# Patient Record
Sex: MALE | Race: WHITE | NOT HISPANIC OR LATINO
[De-identification: names, ages, dates, MRNs, and addresses within clinical notes are randomized per-mention and may not be internally consistent; named-entity substitution may affect disease eponyms.]

---

## 2017-07-10 ENCOUNTER — APPOINTMENT (OUTPATIENT)
Dept: INTERNAL MEDICINE | Facility: CLINIC | Age: 47
End: 2017-07-10

## 2017-07-10 VITALS
HEART RATE: 56 BPM | BODY MASS INDEX: 22.66 KG/M2 | RESPIRATION RATE: 12 BRPM | TEMPERATURE: 98.4 F | OXYGEN SATURATION: 96 % | HEIGHT: 69 IN | SYSTOLIC BLOOD PRESSURE: 115 MMHG | DIASTOLIC BLOOD PRESSURE: 75 MMHG | WEIGHT: 153 LBS

## 2017-07-10 DIAGNOSIS — Z80.8 FAMILY HISTORY OF MALIGNANT NEOPLASM OF OTHER ORGANS OR SYSTEMS: ICD-10-CM

## 2017-07-10 DIAGNOSIS — Z87.898 PERSONAL HISTORY OF OTHER SPECIFIED CONDITIONS: ICD-10-CM

## 2017-07-16 LAB
25(OH)D3 SERPL-MCNC: 32.2 NG/ML
ALBUMIN SERPL ELPH-MCNC: 5 G/DL
ALP BLD-CCNC: 43 U/L
ALT SERPL-CCNC: 14 U/L
ANA SER IF-ACNC: NEGATIVE
ANION GAP SERPL CALC-SCNC: 13 MMOL/L
APPEARANCE: CLEAR
AST SERPL-CCNC: 22 U/L
BASOPHILS # BLD AUTO: 0.02 K/UL
BASOPHILS NFR BLD AUTO: 0.4 %
BILIRUB SERPL-MCNC: 0.7 MG/DL
BILIRUBIN URINE: NEGATIVE
BLOOD URINE: NEGATIVE
BUN SERPL-MCNC: 19 MG/DL
CALCIUM SERPL-MCNC: 9.7 MG/DL
CHLORIDE SERPL-SCNC: 103 MMOL/L
CHOLEST SERPL-MCNC: 226 MG/DL
CHOLEST/HDLC SERPL: 3.1 RATIO
CO2 SERPL-SCNC: 25 MMOL/L
COLOR: YELLOW
CREAT SERPL-MCNC: 1.21 MG/DL
CRP SERPL-MCNC: <0.2 MG/DL
EOSINOPHIL # BLD AUTO: 0.3 K/UL
EOSINOPHIL NFR BLD AUTO: 5.7 %
ERYTHROCYTE [SEDIMENTATION RATE] IN BLOOD BY WESTERGREN METHOD: 4 MM/HR
FOLATE SERPL-MCNC: 13.3 NG/ML
FRUCTOSAMINE SERPL-MCNC: 290 UMOL/L
GLUCOSE QUALITATIVE U: NORMAL MG/DL
GLUCOSE SERPL-MCNC: 90 MG/DL
HBA1C MFR BLD HPLC: 5.3 %
HCT VFR BLD CALC: 45.6 %
HDLC SERPL-MCNC: 73 MG/DL
HGB BLD-MCNC: 15.2 G/DL
IMM GRANULOCYTES NFR BLD AUTO: 0 %
IRON SATN MFR SERPL: 43 %
IRON SERPL-MCNC: 85 UG/DL
KETONES URINE: NEGATIVE
LDLC SERPL CALC-MCNC: 132 MG/DL
LEUKOCYTE ESTERASE URINE: NEGATIVE
LYMPHOCYTES # BLD AUTO: 1.76 K/UL
LYMPHOCYTES NFR BLD AUTO: 33.2 %
MAN DIFF?: NORMAL
MCHC RBC-ENTMCNC: 31 PG
MCHC RBC-ENTMCNC: 33.3 GM/DL
MCV RBC AUTO: 93.1 FL
MONOCYTES # BLD AUTO: 0.55 K/UL
MONOCYTES NFR BLD AUTO: 10.4 %
NEUTROPHILS # BLD AUTO: 2.67 K/UL
NEUTROPHILS NFR BLD AUTO: 50.3 %
NITRITE URINE: NEGATIVE
PH URINE: 5.5
PLATELET # BLD AUTO: 205 K/UL
POTASSIUM SERPL-SCNC: 4.5 MMOL/L
PROT SERPL-MCNC: 7.7 G/DL
PROTEIN URINE: NEGATIVE MG/DL
RBC # BLD: 4.9 M/UL
RBC # FLD: 13.7 %
RHEUMATOID FACT SER QL: 9 IU/ML
SODIUM SERPL-SCNC: 141 MMOL/L
SPECIFIC GRAVITY URINE: 1.02
TIBC SERPL-MCNC: 198 UG/DL
TRIGL SERPL-MCNC: 103 MG/DL
TSH SERPL-ACNC: 0.85 UIU/ML
UIBC SERPL-MCNC: 113 UG/DL
UROBILINOGEN URINE: NORMAL MG/DL
VIT B12 SERPL-MCNC: 733 PG/ML
WBC # FLD AUTO: 5.3 K/UL

## 2017-07-21 LAB

## 2017-07-24 ENCOUNTER — APPOINTMENT (OUTPATIENT)
Dept: INTERNAL MEDICINE | Facility: CLINIC | Age: 47
End: 2017-07-24

## 2017-07-24 VITALS
SYSTOLIC BLOOD PRESSURE: 121 MMHG | DIASTOLIC BLOOD PRESSURE: 70 MMHG | OXYGEN SATURATION: 99 % | HEART RATE: 58 BPM | TEMPERATURE: 98.3 F | BODY MASS INDEX: 22.96 KG/M2 | HEIGHT: 69 IN | WEIGHT: 155 LBS | RESPIRATION RATE: 12 BRPM

## 2017-09-11 ENCOUNTER — APPOINTMENT (OUTPATIENT)
Dept: NEUROLOGY | Facility: CLINIC | Age: 47
End: 2017-09-11
Payer: COMMERCIAL

## 2017-09-11 VITALS
HEART RATE: 55 BPM | DIASTOLIC BLOOD PRESSURE: 70 MMHG | BODY MASS INDEX: 22.96 KG/M2 | WEIGHT: 155 LBS | SYSTOLIC BLOOD PRESSURE: 113 MMHG | HEIGHT: 69 IN

## 2017-09-11 PROCEDURE — 99205 OFFICE O/P NEW HI 60 MIN: CPT

## 2017-09-13 LAB
24R-OH-CALCIDIOL SERPL-MCNC: 43 PG/ML
25(OH)D3 SERPL-MCNC: 33.7 NG/ML
FOLATE RBC-MCNC: 980 NG/ML
HCT VFR BLD CALC: 44 %
MAGNESIUM RBC-MCNC: 5.3 MG/DL
THYROPEROXIDASE AB SERPL IA-ACNC: <10 IU/ML

## 2017-09-14 ENCOUNTER — APPOINTMENT (OUTPATIENT)
Dept: NEUROLOGY | Facility: CLINIC | Age: 47
End: 2017-09-14
Payer: COMMERCIAL

## 2017-09-14 LAB — ZINC RBC-MCNC: 1478 UG/DL

## 2017-09-14 PROCEDURE — 93886 INTRACRANIAL COMPLETE STUDY: CPT

## 2017-09-14 PROCEDURE — 93880 EXTRACRANIAL BILAT STUDY: CPT

## 2017-09-14 PROCEDURE — 93892 TCD EMBOLI DETECT W/O INJ: CPT

## 2017-09-15 LAB — COPPER SERPL-MCNC: 44 UG/DL

## 2017-09-20 LAB — PARANEOPLASTIC AB PNL SER: NORMAL

## 2017-10-06 ENCOUNTER — APPOINTMENT (OUTPATIENT)
Dept: NEUROLOGY | Facility: CLINIC | Age: 47
End: 2017-10-06
Payer: COMMERCIAL

## 2017-10-06 VITALS
HEIGHT: 69 IN | BODY MASS INDEX: 22.51 KG/M2 | WEIGHT: 152 LBS | SYSTOLIC BLOOD PRESSURE: 122 MMHG | DIASTOLIC BLOOD PRESSURE: 76 MMHG | HEART RATE: 54 BPM

## 2017-10-06 PROCEDURE — 99215 OFFICE O/P EST HI 40 MIN: CPT

## 2017-10-25 ENCOUNTER — FORM ENCOUNTER (OUTPATIENT)
Age: 47
End: 2017-10-25

## 2017-10-26 ENCOUNTER — OUTPATIENT (OUTPATIENT)
Dept: OUTPATIENT SERVICES | Facility: HOSPITAL | Age: 47
LOS: 1 days | End: 2017-10-26
Payer: COMMERCIAL

## 2017-10-26 ENCOUNTER — APPOINTMENT (OUTPATIENT)
Dept: MRI IMAGING | Facility: CLINIC | Age: 47
End: 2017-10-26
Payer: COMMERCIAL

## 2017-10-26 DIAGNOSIS — H93.19 TINNITUS, UNSPECIFIED EAR: ICD-10-CM

## 2017-10-26 PROCEDURE — 72141 MRI NECK SPINE W/O DYE: CPT | Mod: 26

## 2017-10-26 PROCEDURE — 70551 MRI BRAIN STEM W/O DYE: CPT | Mod: 26

## 2017-10-26 PROCEDURE — 72141 MRI NECK SPINE W/O DYE: CPT

## 2017-10-26 PROCEDURE — 70551 MRI BRAIN STEM W/O DYE: CPT

## 2017-11-08 ENCOUNTER — OTHER (OUTPATIENT)
Age: 47
End: 2017-11-08

## 2017-11-13 ENCOUNTER — FORM ENCOUNTER (OUTPATIENT)
Age: 47
End: 2017-11-13

## 2017-11-14 ENCOUNTER — APPOINTMENT (OUTPATIENT)
Dept: MRI IMAGING | Facility: CLINIC | Age: 47
End: 2017-11-14
Payer: COMMERCIAL

## 2017-11-14 ENCOUNTER — OUTPATIENT (OUTPATIENT)
Dept: OUTPATIENT SERVICES | Facility: HOSPITAL | Age: 47
LOS: 1 days | End: 2017-11-14
Payer: COMMERCIAL

## 2017-11-14 DIAGNOSIS — R93.0 ABNORMAL FINDINGS ON DIAGNOSTIC IMAGING OF SKULL AND HEAD, NOT ELSEWHERE CLASSIFIED: ICD-10-CM

## 2017-11-14 PROCEDURE — 70553 MRI BRAIN STEM W/O & W/DYE: CPT | Mod: 26

## 2017-11-14 PROCEDURE — A9585: CPT

## 2017-11-14 PROCEDURE — 70545 MR ANGIOGRAPHY HEAD W/DYE: CPT | Mod: 26,59

## 2017-11-14 PROCEDURE — 70546 MR ANGIOGRAPH HEAD W/O&W/DYE: CPT

## 2017-11-14 PROCEDURE — 70553 MRI BRAIN STEM W/O & W/DYE: CPT

## 2017-11-14 PROCEDURE — 70544 MR ANGIOGRAPHY HEAD W/O DYE: CPT

## 2017-11-27 ENCOUNTER — APPOINTMENT (OUTPATIENT)
Dept: NEUROSURGERY | Facility: CLINIC | Age: 47
End: 2017-11-27
Payer: COMMERCIAL

## 2017-11-27 PROCEDURE — 99203 OFFICE O/P NEW LOW 30 MIN: CPT

## 2017-12-05 ENCOUNTER — APPOINTMENT (OUTPATIENT)
Dept: OPHTHALMOLOGY | Facility: CLINIC | Age: 47
End: 2017-12-05
Payer: COMMERCIAL

## 2017-12-05 PROCEDURE — 92083 EXTENDED VISUAL FIELD XM: CPT

## 2017-12-05 PROCEDURE — 99204 OFFICE O/P NEW MOD 45 MIN: CPT

## 2017-12-05 PROCEDURE — 92133 CPTRZD OPH DX IMG PST SGM ON: CPT

## 2017-12-12 ENCOUNTER — OUTPATIENT (OUTPATIENT)
Dept: OUTPATIENT SERVICES | Facility: HOSPITAL | Age: 47
LOS: 1 days | End: 2017-12-12
Payer: COMMERCIAL

## 2017-12-12 ENCOUNTER — FORM ENCOUNTER (OUTPATIENT)
Age: 47
End: 2017-12-12

## 2017-12-12 VITALS
HEIGHT: 69.5 IN | SYSTOLIC BLOOD PRESSURE: 115 MMHG | HEART RATE: 59 BPM | OXYGEN SATURATION: 96 % | RESPIRATION RATE: 18 BRPM | DIASTOLIC BLOOD PRESSURE: 65 MMHG | WEIGHT: 151.02 LBS | TEMPERATURE: 98 F

## 2017-12-12 DIAGNOSIS — I67.1 CEREBRAL ANEURYSM, NONRUPTURED: ICD-10-CM

## 2017-12-12 DIAGNOSIS — Z01.818 ENCOUNTER FOR OTHER PREPROCEDURAL EXAMINATION: ICD-10-CM

## 2017-12-12 LAB
ANION GAP SERPL CALC-SCNC: 16 MMOL/L — SIGNIFICANT CHANGE UP (ref 5–17)
BLD GP AB SCN SERPL QL: NEGATIVE — SIGNIFICANT CHANGE UP
BUN SERPL-MCNC: 22 MG/DL — SIGNIFICANT CHANGE UP (ref 7–23)
CALCIUM SERPL-MCNC: 9.5 MG/DL — SIGNIFICANT CHANGE UP (ref 8.4–10.5)
CHLORIDE SERPL-SCNC: 103 MMOL/L — SIGNIFICANT CHANGE UP (ref 96–108)
CO2 SERPL-SCNC: 24 MMOL/L — SIGNIFICANT CHANGE UP (ref 22–31)
CREAT SERPL-MCNC: 1.11 MG/DL — SIGNIFICANT CHANGE UP (ref 0.5–1.3)
GLUCOSE SERPL-MCNC: 74 MG/DL — SIGNIFICANT CHANGE UP (ref 70–99)
HCT VFR BLD CALC: 43.1 % — SIGNIFICANT CHANGE UP (ref 39–50)
HGB BLD-MCNC: 14.7 G/DL — SIGNIFICANT CHANGE UP (ref 13–17)
MCHC RBC-ENTMCNC: 30 PG — SIGNIFICANT CHANGE UP (ref 27–34)
MCHC RBC-ENTMCNC: 34.1 GM/DL — SIGNIFICANT CHANGE UP (ref 32–36)
MCV RBC AUTO: 88 FL — SIGNIFICANT CHANGE UP (ref 80–100)
PLATELET # BLD AUTO: 223 K/UL — SIGNIFICANT CHANGE UP (ref 150–400)
POTASSIUM SERPL-MCNC: 4.4 MMOL/L — SIGNIFICANT CHANGE UP (ref 3.5–5.3)
POTASSIUM SERPL-SCNC: 4.4 MMOL/L — SIGNIFICANT CHANGE UP (ref 3.5–5.3)
RBC # BLD: 4.9 M/UL — SIGNIFICANT CHANGE UP (ref 4.2–5.8)
RBC # FLD: 12.8 % — SIGNIFICANT CHANGE UP (ref 10.3–14.5)
RH IG SCN BLD-IMP: POSITIVE — SIGNIFICANT CHANGE UP
SODIUM SERPL-SCNC: 143 MMOL/L — SIGNIFICANT CHANGE UP (ref 135–145)
WBC # BLD: 5.86 K/UL — SIGNIFICANT CHANGE UP (ref 3.8–10.5)
WBC # FLD AUTO: 5.86 K/UL — SIGNIFICANT CHANGE UP (ref 3.8–10.5)

## 2017-12-12 PROCEDURE — G0463: CPT

## 2017-12-12 PROCEDURE — 86850 RBC ANTIBODY SCREEN: CPT

## 2017-12-12 PROCEDURE — 86901 BLOOD TYPING SEROLOGIC RH(D): CPT

## 2017-12-12 PROCEDURE — 85027 COMPLETE CBC AUTOMATED: CPT

## 2017-12-12 PROCEDURE — 80048 BASIC METABOLIC PNL TOTAL CA: CPT

## 2017-12-12 PROCEDURE — 86900 BLOOD TYPING SEROLOGIC ABO: CPT

## 2017-12-12 NOTE — H&P PST ADULT - HISTORY OF PRESENT ILLNESS
This is a 46 y/o male c/o tinnitus following  the intake of cipro 2013, c/o insomnia and "eye floater" and feel" eye pressure", he presents today for cerebral angiogram

## 2017-12-12 NOTE — H&P PST ADULT - NSANTHOSAYNRD_GEN_A_CORE
No. TANVI screening performed.  STOP BANG Legend: 0-2 = LOW Risk; 3-4 = INTERMEDIATE Risk; 5-8 = HIGH Risk

## 2017-12-13 ENCOUNTER — OUTPATIENT (OUTPATIENT)
Dept: OUTPATIENT SERVICES | Facility: HOSPITAL | Age: 47
LOS: 1 days | End: 2017-12-13
Payer: COMMERCIAL

## 2017-12-13 DIAGNOSIS — Q04.9 CONGENITAL MALFORMATION OF BRAIN, UNSPECIFIED: ICD-10-CM

## 2017-12-13 DIAGNOSIS — I67.1 CEREBRAL ANEURYSM, NONRUPTURED: ICD-10-CM

## 2017-12-13 LAB
ANION GAP SERPL CALC-SCNC: 10 MMOL/L — SIGNIFICANT CHANGE UP (ref 5–17)
BASOPHILS # BLD AUTO: 0 K/UL — SIGNIFICANT CHANGE UP (ref 0–0.2)
BASOPHILS NFR BLD AUTO: 0.5 % — SIGNIFICANT CHANGE UP (ref 0–2)
BUN SERPL-MCNC: 14 MG/DL — SIGNIFICANT CHANGE UP (ref 7–23)
CALCIUM SERPL-MCNC: 8.6 MG/DL — SIGNIFICANT CHANGE UP (ref 8.4–10.5)
CHLORIDE SERPL-SCNC: 105 MMOL/L — SIGNIFICANT CHANGE UP (ref 96–108)
CO2 SERPL-SCNC: 26 MMOL/L — SIGNIFICANT CHANGE UP (ref 22–31)
CREAT SERPL-MCNC: 1.06 MG/DL — SIGNIFICANT CHANGE UP (ref 0.5–1.3)
EOSINOPHIL # BLD AUTO: 0.2 K/UL — SIGNIFICANT CHANGE UP (ref 0–0.5)
EOSINOPHIL NFR BLD AUTO: 3.7 % — SIGNIFICANT CHANGE UP (ref 0–6)
GLUCOSE SERPL-MCNC: 116 MG/DL — HIGH (ref 70–99)
HCT VFR BLD CALC: 42.2 % — SIGNIFICANT CHANGE UP (ref 39–50)
HGB BLD-MCNC: 14.5 G/DL — SIGNIFICANT CHANGE UP (ref 13–17)
LYMPHOCYTES # BLD AUTO: 1.6 K/UL — SIGNIFICANT CHANGE UP (ref 1–3.3)
LYMPHOCYTES # BLD AUTO: 29.7 % — SIGNIFICANT CHANGE UP (ref 13–44)
MCHC RBC-ENTMCNC: 31.5 PG — SIGNIFICANT CHANGE UP (ref 27–34)
MCHC RBC-ENTMCNC: 34.4 GM/DL — SIGNIFICANT CHANGE UP (ref 32–36)
MCV RBC AUTO: 91.5 FL — SIGNIFICANT CHANGE UP (ref 80–100)
MONOCYTES # BLD AUTO: 0.4 K/UL — SIGNIFICANT CHANGE UP (ref 0–0.9)
MONOCYTES NFR BLD AUTO: 7.5 % — SIGNIFICANT CHANGE UP (ref 2–14)
NEUTROPHILS # BLD AUTO: 3.1 K/UL — SIGNIFICANT CHANGE UP (ref 1.8–7.4)
NEUTROPHILS NFR BLD AUTO: 58.7 % — SIGNIFICANT CHANGE UP (ref 43–77)
PLATELET # BLD AUTO: 197 K/UL — SIGNIFICANT CHANGE UP (ref 150–400)
POTASSIUM SERPL-MCNC: 4.6 MMOL/L — SIGNIFICANT CHANGE UP (ref 3.5–5.3)
POTASSIUM SERPL-SCNC: 4.6 MMOL/L — SIGNIFICANT CHANGE UP (ref 3.5–5.3)
RBC # BLD: 4.61 M/UL — SIGNIFICANT CHANGE UP (ref 4.2–5.8)
RBC # FLD: 11.5 % — SIGNIFICANT CHANGE UP (ref 10.3–14.5)
SODIUM SERPL-SCNC: 141 MMOL/L — SIGNIFICANT CHANGE UP (ref 135–145)
WBC # BLD: 5.4 K/UL — SIGNIFICANT CHANGE UP (ref 3.8–10.5)
WBC # FLD AUTO: 5.4 K/UL — SIGNIFICANT CHANGE UP (ref 3.8–10.5)

## 2017-12-13 PROCEDURE — 86901 BLOOD TYPING SEROLOGIC RH(D): CPT

## 2017-12-13 PROCEDURE — C1887: CPT

## 2017-12-13 PROCEDURE — 36226 PLACE CATH VERTEBRAL ART: CPT | Mod: 50

## 2017-12-13 PROCEDURE — 85027 COMPLETE CBC AUTOMATED: CPT

## 2017-12-13 PROCEDURE — C1769: CPT

## 2017-12-13 PROCEDURE — 36223 PLACE CATH CAROTID/INOM ART: CPT | Mod: 59,LT

## 2017-12-13 PROCEDURE — 86900 BLOOD TYPING SEROLOGIC ABO: CPT

## 2017-12-13 PROCEDURE — 36227 PLACE CATH XTRNL CAROTID: CPT | Mod: 50

## 2017-12-13 PROCEDURE — 36224 PLACE CATH CAROTD ART: CPT

## 2017-12-13 PROCEDURE — 36226 PLACE CATH VERTEBRAL ART: CPT

## 2017-12-13 PROCEDURE — 36223 PLACE CATH CAROTID/INOM ART: CPT | Mod: 59

## 2017-12-13 PROCEDURE — 36224 PLACE CATH CAROTD ART: CPT | Mod: RT

## 2017-12-13 PROCEDURE — 36227 PLACE CATH XTRNL CAROTID: CPT

## 2017-12-13 PROCEDURE — C1894: CPT

## 2017-12-13 PROCEDURE — 76377 3D RENDER W/INTRP POSTPROCES: CPT | Mod: 26

## 2017-12-13 PROCEDURE — 80048 BASIC METABOLIC PNL TOTAL CA: CPT

## 2017-12-13 RX ORDER — SODIUM CHLORIDE 9 MG/ML
1000 INJECTION INTRAMUSCULAR; INTRAVENOUS; SUBCUTANEOUS
Qty: 0 | Refills: 0 | Status: DISCONTINUED | OUTPATIENT
Start: 2017-12-13 | End: 2017-12-28

## 2017-12-13 NOTE — CHART NOTE - NSCHARTNOTEFT_GEN_A_CORE
Interventional Neuro- Radiology   Procedure Note PA-C    Procedure: Selective Cerebral Angiography   Pre- Procedure Diagnosis:  abnormal vessels  Post- Procedure Diagnosis:    : Dr. Brandon Acosta  Fellow:        Dr Shasta Chapin    Physician Assistant: GILDA Espino PA-C  Nurse:                      Caroline Branch RN  Radiologic Tech:    [facility]         Anesthesiologist:    Dr Lance Rene  Sheath:                  4 Indian short sheath     I/Os:  Estimated blood loss less than 10cc  IV fluids:100 cc     Urine output due to void   Contrast Omnipaque 240      cc           Vitals: BP         HR 59      Spo2 100%    Preliminary Report:  Using a 4 Indian short sheath to the right groin under MAC sedation via left vertebral artery,  left common carotid artery, left external carotid artery, right vertebral artery,  right common carotid artery, right external carotid artery  a selective cerebral angiography was performed and demonstrates ________. ( Official note to follow).  Patient tolerated procedure well, hemodynamically stable, no change in neurological status compared to baseline.  Results discussed with neurosurgery, patient and patient's  family.  Groin sheath was removed,  manual compression held to hemostasis  for  21 minutes, no active bleeding, no hematoma, Avitene applied,  quick clot and safeguard balloon dressing applied at   STAT labs:  CBC BMP    Patient transferred to Recovery Room Interventional Neuro- Radiology   Procedure Note PA-C    Procedure: Selective Cerebral Angiography   Pre- Procedure Diagnosis:  abnormal vessels  Post- Procedure Diagnosis:    : Dr. Brandon Acsota  Fellow:      Dr Shasta Chapin    Physician Assistant: GILDA Espino PA-C  Nurse:                      Caroline Branch RN  Radiologic Tech:    [facility]         Anesthesiologist:    Dr Lance Rene  Sheath:                  4 Djiboutian short sheath     I/Os:  Estimated blood loss less than 10cc  IV fluids:100 cc     Urine output due to void   Contrast Omnipaque 240      cc           Vitals: /65    HR 59      Spo2 100%    Preliminary Report:  Using a 4 Djiboutian short sheath to the right groin under MAC sedation via left vertebral artery,  left common carotid artery, left external carotid artery, right vertebral artery,  right common carotid artery, right external carotid artery  a selective cerebral angiography was performed and demonstrates ________. ( Official note to follow).  Patient tolerated procedure well, hemodynamically stable, no change in neurological status compared to baseline.  Results discussed with neurosurgery, patient and patient's  family.  Groin sheath was removed,  manual compression held to hemostasis  for  21 minutes, no active bleeding, no hematoma, Avitene applied,  quick clot and safeguard balloon dressing applied at   STAT labs:  CBC BMP    Patient transferred to Recovery Room Interventional Neuro- Radiology   Procedure Note PA-C    Procedure: Selective Cerebral Angiography   Pre- Procedure Diagnosis:  abnormal vessels  Post- Procedure Diagnosis:    : Dr. Brandon Acosta  Fellow:     Dr Shasta Chapin    Physician Assistant: GILDA Espino PA-C  Nurse:                      Caroline Branch RN  Radiologic Tech:    [facility]         Anesthesiologist:    Dr Lance Rene  Sheath:                  4 Lithuanian short sheath     I/Os:  Estimated blood loss less than 10cc  IV fluids:100 cc 600  Urine output due to void  Contrast Omnipaque 240 203cc           Vitals: /65   HR 59    Spo2 100%    Preliminary Report:  Using a 4 Lithuanian short sheath to the right groin under MAC sedation via left vertebral artery, left common carotid artery, left external carotid artery, right vertebral artery, right common carotid artery, right external carotid artery a selective cerebral angiography was performed and demonstrated                          Official note to follow).  Patient tolerated procedure well, hemodynamically stable, no change in neurological status compared to baseline.  Results discussed with neurosurgery, patient and patient's family.  Groin sheath was removed, manual compression held to hemostasis for 21 minutes, no active bleeding, no hematoma, Avitene applied, quick clot and safeguard balloon dressing applied at   STAT labs: CBC BMP    Patient transferred to Recovery Room Interventional Neuro- Radiology   Procedure Note PA-C    Procedure: Selective Cerebral Angiography   Pre- Procedure Diagnosis:  abnormal vessels  Post- Procedure Diagnosis:    : Dr. Brandon Acosta  Fellow:     Dr Shasta Chapin    Physician Assistant: GILDA Espino PA-C  Nurse:                      Caroline Branch RN  Radiologic Tech:     [facility]         Anesthesiologist:    Dr Lance Rene  Sheath:                  4 Namibian short sheath     I/Os:  Estimated blood loss less than 10cc  IV fluids:100 cc 600  Urine output due to void  Contrast Omnipaque 240 203cc           Vitals: /65   HR 59    Spo2 100%    Preliminary Report:  Using a 4 Namibian short sheath to the right groin under MAC sedation via left vertebral artery, left common carotid artery, left external carotid artery, right vertebral artery, right common carotid artery, right external carotid artery a selective cerebral angiography was performed and demonstrated                          Official note to follow).  Patient tolerated procedure well, hemodynamically stable, no change in neurological status compared to baseline.  Results discussed with neurosurgery, patient and patient's family.  Groin sheath was removed, manual compression held to hemostasis for 21 minutes, no active bleeding, no hematoma, Avitene applied, quick clot and safeguard balloon dressing applied at   STAT labs: CBC BMP 1530 hours   Patient transferred to Recovery Room Interventional Neuro- Radiology   Procedure Note PA-C    Procedure: Selective Cerebral Angiography   Pre- Procedure Diagnosis:  abnormal vessels  Post- Procedure Diagnosis:    : Dr. Brandon Acosta  Fellow:     Dr Shasta Chapin    Physician Assistant: GILDA Espino PA-C  Nurse:                      Caroline Branch RN  Radiologic Tech:     [facility]         Anesthesiologist:      Dr Lance Rene  Sheath:                    4 English short sheath     I/Os:  Estimated blood loss less than 10cc  IV fluids:100 cc 600  Urine output due to void  Contrast Omnipaque 240 203cc           Vitals: /65   HR 59    Spo2 100%    Preliminary Report:  Using a 4 English short sheath to the right groin under MAC sedation via left vertebral artery, left common carotid artery, left external carotid artery, right vertebral artery, right common carotid artery, right external carotid artery a selective cerebral angiography was performed and demonstrated                          Official note to follow).  Patient tolerated procedure well, hemodynamically stable, no change in neurological status compared to baseline.  Results discussed with neurosurgery, patient and patient's family.  Groin sheath was removed, manual compression held to hemostasis for 21 minutes, no active bleeding, no hematoma, Avitene applied, quick clot and safeguard balloon dressing applied at   STAT labs: CBC BMP 1530 hours   Patient transferred to Recovery Room Interventional Neuro- Radiology   Procedure Note PA-C    Procedure: Selective Cerebral Angiography   Pre- Procedure Diagnosis:  abnormal vessels  Post- Procedure Diagnosis:    : Dr. Brandon Acosta  Fellow:     Dr Shasta Chapin    Physician Assistant: GILDA Espino PA-C  Nurse:                      Caroline Branch RN  Radiologic Tech:      [facility]         Anesthesiologist:      Dr Lance Rene  Sheath:                    4 Solomon Islander short sheath     I/Os:  Estimated blood loss less than 10cc  IV fluids:100 cc 600  Urine output due to void  Contrast Omnipaque 240 203cc           Vitals: /65   HR 59    Spo2 100%    Preliminary Report:  Using a 4 Solomon Islander short sheath to the right groin under MAC sedation via left vertebral artery, left common carotid artery, left external carotid artery, right vertebral artery, right common carotid artery, right external carotid artery a selective cerebral angiography was performed and demonstrated                          Official note to follow).  Patient tolerated procedure well, hemodynamically stable, no change in neurological status compared to baseline.  Results discussed with neurosurgery, patient and patient's family.  Groin sheath was removed, manual compression held to hemostasis for 21 minutes, no active bleeding, no hematoma, Avitene applied, quick clot and safeguard balloon dressing applied at   STAT labs: CBC BMP 1530 hours   Patient transferred to Recovery Room Interventional Neuro- Radiology   Procedure Note PA-C    Procedure: Selective Cerebral Angiography   Pre- Procedure Diagnosis:  abnormal vessels  Post- Procedure Diagnosis: right occipital vascular malformation    : Dr. Brandon Acosta  Fellow:     Dr Shasta Chapin    Physician Assistant: GILDA Espino PA-C  Nurse:                      Caroline Branch RN  Radiologic Tech:      [facility]         Anesthesiologist:      Dr Lance Rene  Sheath:                    4 Peruvian short sheath     I/Os:  Estimated blood loss less than 10cc  IV fluids:100 cc 600  Urine output due to void  Contrast Omnipaque 240 203cc           Vitals: /65   HR 59   Spo2 100%    Preliminary Report:  Using a 4 Peruvian short sheath to the right groin under MAC sedation via left vertebral artery, left common carotid artery, left external carotid artery, right vertebral artery, right common carotid artery, right external carotid artery a selective cerebral angiography was performed and demonstrated a 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 Dural supply via right MMA, right occipital, and left falx cerebri. Pial supply via distal right PCA. No venous occlusion. Official note to follow.  Patient tolerated procedure well, hemodynamically stable, no change in neurological status compared to baseline.  Results discussed with neurosurgery, patient and patient's family.  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 Brandon Acosta  Fellow:     Dr Shasta Chapin    Physician Assistant: GILDA Espino PA-C  Nurse:                      Caroline Branch RN  Radiologic Tech:      [facility]         Anesthesiologist:      Dr Lance Rene  Sheath:                     4 Singaporean short sheath     I/Os:  Estimated blood loss less than 10cc  IV fluids:100 cc 600  Urine output due to void  Contrast Omnipaque 240 203cc           Vitals: /65   HR 59   Spo2 100%    Preliminary Report:  Using a 4 Singaporean short sheath to the right groin under MAC sedation via left vertebral artery, left common carotid artery, left external carotid artery, right vertebral artery, right common carotid artery, right external carotid artery a selective cerebral angiography was performed and demonstrated a 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 Dural supply via right MMA, right occipital, and left falx cerebri. Pial supply via distal right PCA. No venous occlusion. Official note to follow.  Patient tolerated procedure well, hemodynamically stable, no change in neurological status compared to baseline.  Results discussed with neurosurgery, patient and patient's family.  Groin sheath was removed, manual compression held to hemostasis for 21 minutes, no active bleeding, no hematoma, Avitene applied, quick clot and safeguard balloon dressing applied at 11:20am  STAT labs: CBC BMP 1530 hours   Patient transferred to Recovery Room

## 2017-12-13 NOTE — CHART NOTE - NSCHARTNOTEFT_GEN_A_CORE
Interventional Neuro Radiology  Pre-Procedure Note PA-C    This is a 47 year old right hand dominant male with complaints of tinnitus with an MRI of the brain with abnormal vessels. Patient presents to Neuro IR for a selective cerebral angiography to study vessels.        Allergies: Cipro (Other)  PMHX: Hypotestosteronism: resolved  PSHX:No significant past surgical history  Social History: non-smoker   FAMILY HISTORY: non-contributory   Current Medications:   CBC                        14.7   5.86  )-----------( 223                  43.1     BMP    143  |  103  |  22  ----------------------------<  74  4.4   |  24  |  1.11      Blood Bank: A positive     Assessment/Plan:  This is a 47 year old left hand dominant male with an MRI of the brain which revealed abnormal vessels. Patient presents to Neuro IR for a selective cerebral angiography to study vessels.   Procedure, goals, risks, benefits and alternatives were discussed with patient and patient's family. All questions were answered. Risks include but are not limited to stroke, vessel injury, hemorrhage, and or right  groin hematoma. Patient and patient's son demonstrate understanding of all risks involved with this procedure and wishes to continue. Appropriate content was obtained from patient and consent is in the patient's chart. Interventional Neuro Radiology  Pre-Procedure Note PA-C    This is a 47 year old right hand dominant male with complaints of tinnitus with an MRI of the brain with abnormal vessels. Patient presents to Neuro IR for a selective cerebral angiography to study vessels. upon exam patient is A+O x 3, speech is fluent, Vanita ambulates without assist.    Allergies: Cipro, Levaquin   PMHX: Hypotestosteronism: resolved  PSHX: No significant past surgical history  Social History: non-smoker   FAMILY HISTORY: non-contributory   Current Medications: no current medications  CBC                        14.7   5.86  )-----------( 223                  43.1     BMP    143  |  103  |  22  ----------------------------<  74  4.4   |  24  |  1.11      Blood Bank: A positive available    Assessment/Plan:  This is a 47 year old left hand dominant male with an MRI of the brain which revealed abnormal vessels. Patient presents to Neuro IR for a selective cerebral angiography to study vessels.   Procedure, goals, risks, benefits and alternatives were discussed with patient and patient's family. All questions were answered. Risks include but are not limited to stroke, vessel injury, hemorrhage, and or right groin hematoma. Patient and patient's son demonstrate understanding of all risks involved with this procedure and wishes to continue. Appropriate content was obtained from patient and consent is in the patient's chart. Interventional Neuro Radiology  Pre-Procedure Note PA-C    This is a 47 year old left hand dominant male with complaints of tinnitus, with an MRI of the brain which revealed abnormal vessels. Patient presents to Neuro IR for a selective cerebral angiography to study vessels. Upon exam patient is A+O x 3, speech is fluent, recent and remote memory intact, Vanita ambulates without assist.    Allergies: Cipro, Levaquin   PMHX: Hypotestosteronism: resolved  PSHX: No significant past surgical history  Social History: non-smoker   FAMILY HISTORY: non-contributory   Current Medications: no current medications  CBC                        14.7   5.86  )-----------( 223                  43.1     BMP    143  |  103  |  22  ----------------------------<  74  4.4   |  24  |  1.11      Blood Bank: A positive available    Assessment/Plan:  This is a 47 year old left hand dominant male with an MRI of the brain which revealed abnormal vessels. Patient presents to Neuro IR for a selective cerebral angiography to study vessels.   Procedure, goals, risks, benefits and alternatives were discussed with patient and patient's family. All questions were answered. Risks include but are not limited to stroke, vessel injury, hemorrhage, and or right groin hematoma. Patient and patient's son demonstrate understanding of all risks involved with this procedure and wishes to continue. Appropriate content was obtained from patient and consent is in the patient's chart.

## 2017-12-19 ENCOUNTER — APPOINTMENT (OUTPATIENT)
Dept: INTERNAL MEDICINE | Facility: CLINIC | Age: 47
End: 2017-12-19
Payer: COMMERCIAL

## 2017-12-19 VITALS
HEIGHT: 69 IN | SYSTOLIC BLOOD PRESSURE: 150 MMHG | HEART RATE: 71 BPM | OXYGEN SATURATION: 94 % | RESPIRATION RATE: 12 BRPM | DIASTOLIC BLOOD PRESSURE: 81 MMHG

## 2017-12-19 DIAGNOSIS — R29.2 ABNORMAL REFLEX: ICD-10-CM

## 2017-12-19 DIAGNOSIS — R79.0 ABNORMAL LVL OF BLOOD MINERAL: ICD-10-CM

## 2017-12-19 PROCEDURE — 99214 OFFICE O/P EST MOD 30 MIN: CPT

## 2017-12-21 ENCOUNTER — APPOINTMENT (OUTPATIENT)
Dept: NEUROSURGERY | Facility: CLINIC | Age: 47
End: 2017-12-21

## 2017-12-31 PROBLEM — R79.0 LOW SERUM COPPER FOR AGE: Status: ACTIVE | Noted: 2017-10-06

## 2017-12-31 PROBLEM — R29.2 HYPERREFLEXIC: Status: ACTIVE | Noted: 2017-09-11

## 2018-01-03 ENCOUNTER — APPOINTMENT (OUTPATIENT)
Dept: NEUROSURGERY | Facility: CLINIC | Age: 48
End: 2018-01-03
Payer: COMMERCIAL

## 2018-01-03 VITALS
BODY MASS INDEX: 22.22 KG/M2 | HEART RATE: 81 BPM | OXYGEN SATURATION: 94 % | DIASTOLIC BLOOD PRESSURE: 73 MMHG | TEMPERATURE: 97.5 F | SYSTOLIC BLOOD PRESSURE: 114 MMHG | WEIGHT: 150 LBS | HEIGHT: 69 IN

## 2018-01-03 PROCEDURE — 99205 OFFICE O/P NEW HI 60 MIN: CPT

## 2018-01-10 ENCOUNTER — FORM ENCOUNTER (OUTPATIENT)
Age: 48
End: 2018-01-10

## 2018-01-10 ENCOUNTER — APPOINTMENT (OUTPATIENT)
Dept: NEUROLOGY | Facility: CLINIC | Age: 48
End: 2018-01-10
Payer: COMMERCIAL

## 2018-01-10 VITALS — DIASTOLIC BLOOD PRESSURE: 70 MMHG | SYSTOLIC BLOOD PRESSURE: 110 MMHG | HEART RATE: 66 BPM

## 2018-01-10 PROCEDURE — 99214 OFFICE O/P EST MOD 30 MIN: CPT

## 2018-01-11 ENCOUNTER — RESULT REVIEW (OUTPATIENT)
Age: 48
End: 2018-01-11

## 2018-01-11 ENCOUNTER — OUTPATIENT (OUTPATIENT)
Dept: OUTPATIENT SERVICES | Facility: HOSPITAL | Age: 48
LOS: 1 days | End: 2018-01-11
Payer: COMMERCIAL

## 2018-01-11 ENCOUNTER — APPOINTMENT (OUTPATIENT)
Dept: RADIOLOGY | Facility: HOSPITAL | Age: 48
End: 2018-01-11
Payer: COMMERCIAL

## 2018-01-11 DIAGNOSIS — Z00.00 ENCOUNTER FOR GENERAL ADULT MEDICAL EXAMINATION WITHOUT ABNORMAL FINDINGS: ICD-10-CM

## 2018-01-11 DIAGNOSIS — I67.1 CEREBRAL ANEURYSM, NONRUPTURED: ICD-10-CM

## 2018-01-11 LAB
APPEARANCE CSF: CLEAR — SIGNIFICANT CHANGE UP
APPEARANCE SPUN FLD: COLORLESS — SIGNIFICANT CHANGE UP
COLOR CSF: SIGNIFICANT CHANGE UP
GLUCOSE CSF-MCNC: 60 MG/DL — SIGNIFICANT CHANGE UP (ref 40–70)
GRAM STN FLD: SIGNIFICANT CHANGE UP
LABORATORY COMMENT REPORT: SIGNIFICANT CHANGE UP
LYMPHOCYTES # CSF: 54 % — SIGNIFICANT CHANGE UP (ref 40–80)
MONOS+MACROS NFR CSF: 46 % — HIGH (ref 15–45)
NEUTROPHILS # CSF: 0 % — SIGNIFICANT CHANGE UP (ref 0–6)
NRBC NFR CSF: 1 /UL — SIGNIFICANT CHANGE UP (ref 0–5)
PROT CSF-MCNC: 37 MG/DL — SIGNIFICANT CHANGE UP (ref 15–45)
RBC # CSF: 0 /UL — SIGNIFICANT CHANGE UP (ref 0–0)
SOURCE HSV 1/2: SIGNIFICANT CHANGE UP
SPECIMEN SOURCE: SIGNIFICANT CHANGE UP
TUBE TYPE: SIGNIFICANT CHANGE UP

## 2018-01-11 PROCEDURE — 62270 DX LMBR SPI PNXR: CPT

## 2018-01-11 PROCEDURE — 88108 CYTOPATH CONCENTRATE TECH: CPT

## 2018-01-11 PROCEDURE — 82945 GLUCOSE OTHER FLUID: CPT

## 2018-01-11 PROCEDURE — 87070 CULTURE OTHR SPECIMN AEROBIC: CPT

## 2018-01-11 PROCEDURE — 86403 PARTICLE AGGLUT ANTBDY SCRN: CPT

## 2018-01-11 PROCEDURE — 87116 MYCOBACTERIA CULTURE: CPT

## 2018-01-11 PROCEDURE — 87205 SMEAR GRAM STAIN: CPT

## 2018-01-11 PROCEDURE — 86592 SYPHILIS TEST NON-TREP QUAL: CPT

## 2018-01-11 PROCEDURE — 77003 FLUOROGUIDE FOR SPINE INJECT: CPT

## 2018-01-11 PROCEDURE — 87476 LYME DIS DNA AMP PROBE: CPT

## 2018-01-11 PROCEDURE — 87529 HSV DNA AMP PROBE: CPT

## 2018-01-11 PROCEDURE — 84157 ASSAY OF PROTEIN OTHER: CPT

## 2018-01-11 PROCEDURE — 88108 CYTOPATH CONCENTRATE TECH: CPT | Mod: 26

## 2018-01-11 PROCEDURE — 89051 BODY FLUID CELL COUNT: CPT

## 2018-01-11 PROCEDURE — 86617 LYME DISEASE ANTIBODY: CPT

## 2018-01-11 PROCEDURE — 77003 FLUOROGUIDE FOR SPINE INJECT: CPT | Mod: 26

## 2018-01-12 ENCOUNTER — APPOINTMENT (OUTPATIENT)
Dept: NEUROLOGY | Facility: CLINIC | Age: 48
End: 2018-01-12

## 2018-01-12 LAB
NIGHT BLUE STAIN TISS: SIGNIFICANT CHANGE UP
NON-GYNECOLOGICAL CYTOLOGY STUDY: SIGNIFICANT CHANGE UP
SPECIMEN SOURCE: SIGNIFICANT CHANGE UP

## 2018-01-13 LAB — VDRL CSF-TITR: NEGATIVE — SIGNIFICANT CHANGE UP

## 2018-01-14 LAB
CRYPTOC AG CSF-ACNC: NEGATIVE — SIGNIFICANT CHANGE UP
CULTURE RESULTS: NO GROWTH — SIGNIFICANT CHANGE UP
SPECIMEN SOURCE: SIGNIFICANT CHANGE UP

## 2018-01-16 LAB — B BURGDOR DNA SPEC QL NAA+PROBE: NEGATIVE — SIGNIFICANT CHANGE UP

## 2018-01-23 LAB
B BURGDOR AB CSF-ACNC: SIGNIFICANT CHANGE UP
B BURGDOR AB SER IB-ACNC: SIGNIFICANT CHANGE UP

## 2018-01-24 LAB
MISCELLANEOUS TEST NAME: SIGNIFICANT CHANGE UP
MISCELLANEOUS, NORMALS: SIGNIFICANT CHANGE UP
MISCELLANEOUS, RESULT: SIGNIFICANT CHANGE UP

## 2018-02-18 ENCOUNTER — EMERGENCY (EMERGENCY)
Facility: HOSPITAL | Age: 48
LOS: 1 days | Discharge: HOME | End: 2018-02-18
Attending: EMERGENCY MEDICINE | Admitting: EMERGENCY MEDICINE
Payer: COMMERCIAL

## 2018-02-18 VITALS
DIASTOLIC BLOOD PRESSURE: 60 MMHG | OXYGEN SATURATION: 97 % | SYSTOLIC BLOOD PRESSURE: 139 MMHG | HEART RATE: 92 BPM | TEMPERATURE: 98 F | RESPIRATION RATE: 17 BRPM | WEIGHT: 149.91 LBS

## 2018-02-18 PROCEDURE — 99283 EMERGENCY DEPT VISIT LOW MDM: CPT | Mod: 25

## 2018-02-18 PROCEDURE — 99283 EMERGENCY DEPT VISIT LOW MDM: CPT

## 2018-02-18 PROCEDURE — 73080 X-RAY EXAM OF ELBOW: CPT

## 2018-02-18 PROCEDURE — 73080 X-RAY EXAM OF ELBOW: CPT | Mod: 26,RT

## 2018-02-18 NOTE — ED PROVIDER NOTE - MEDICAL DECISION MAKING DETAILS
47M with possible radial head fx in posterior splint, will reeval with xrays and reassess need for splint/ further care 47M with possible radial head fx in posterior splint, will reeval with xrays and reassess need for splint/ further care  Sangita: Patient with right elbow pain s/p working out, no trauma, in posterior elbow splint. will repeat xray, recommend outpatient f/u with ortho. 47M with possible radial head fx in posterior splint, will reeval with xrays and reassess need for splint/ further orthopedic care  Sangita: Patient with right elbow pain s/p working out, no trauma, in posterior elbow splint. will repeat xray, recommend outpatient f/u with ortho.

## 2018-02-18 NOTE — ED ADULT NURSE NOTE - OBJECTIVE STATEMENT
48 y/o M patient presents to ED from home c/o of possible injury to elbow. Patient states he was working out on Monday and noticed soreness in his right elbow and arm. Patient thought it was usual soreness post workout but patient states he began to notice swelling and pain on extension. Patient reports no pain upon arrival to ED. Upon assessment, patient A&Ox3. speech clear and coherent. skin warm, dry, and intact. cap refill <3 sec. radial pulses present. no swelling noted in arm. equal strength in upper extremities bilaterally. pain upon extension in right arm. ANGELI. MD at bedside. 48 y/o M patient presents to ED from home c/o of possible injury to elbow. Patient states he was working out on Monday and noticed soreness in his right elbow and arm. Patient thought it was usual soreness post workout but patient states he began to notice swelling and pain on extension. Patient states he went to urgent care and was told he had possible elbow injury but is unsure what it is. Patient reports no pain upon arrival to ED. Upon assessment, patient A&Ox3. speech clear and coherent. skin warm, dry, and intact. cap refill <3 sec. radial pulses present. no swelling noted in arm. equal strength in upper extremities bilaterally. pain upon extension in right arm. ANGELI. MD at bedside.

## 2018-02-18 NOTE — ED PROVIDER NOTE - OBJECTIVE STATEMENT
47M no signficiant pmhx c/o of right elbow soreness and joint swelling x 6 days, occurring after working out extensively doing pushups 6 days ago, notes no other trauma, no fevers/chills, no ecchymosis or pain, just discomfort. 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 Patient denies worsening pain. wanted to find out if needs intervention. 47M no signficiant pmhx c/o of right elbow soreness and joint swelling x 6 days, occurring after working out extensively doing pushups 6 days ago, notes no other trauma, no fevers/chills, no ecchymosis or pain, just discomfort. 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 Patient denies worsening pain. wanted to find out if needs intervention by orthopedics 47M no significant pmhx c/o of right elbow soreness and joint swelling x 6 days, occurring after working out extensively doing pushups 6 days ago, notes no other trauma, no fevers/chills, no ecchymosis or pain, just discomfort. 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 Patient denies worsening pain. wanted to find out if needs intervention by orthopedics

## 2018-02-18 NOTE — ED PROVIDER NOTE - PHYSICAL EXAMINATION
AAOx3, NAD   Head: NCAT  ENT: Airway patent, MMM  MSK: No gross abnormalities,  mild edema without ecchymosis at the lateral right elbow, no pain/tenderness on palpation, range of elbow maintained   HEME: Extremities warm, pulses intact and symmetrical in both upper extremities  Skin: No rashes, no lesions  Neuro: No gross neurologic deficits

## 2018-02-22 ENCOUNTER — APPOINTMENT (OUTPATIENT)
Dept: ORTHOPEDIC SURGERY | Facility: CLINIC | Age: 48
End: 2018-02-22
Payer: COMMERCIAL

## 2018-02-22 VITALS
HEART RATE: 68 BPM | WEIGHT: 150 LBS | HEIGHT: 69 IN | BODY MASS INDEX: 22.22 KG/M2 | SYSTOLIC BLOOD PRESSURE: 160 MMHG | DIASTOLIC BLOOD PRESSURE: 70 MMHG

## 2018-02-22 VITALS — BODY MASS INDEX: 21.47 KG/M2 | WEIGHT: 150 LBS | HEIGHT: 70 IN

## 2018-02-22 DIAGNOSIS — S46.211A STRAIN OF MUSCLE, FASCIA AND TENDON OF OTHER PARTS OF BICEPS, RIGHT ARM, INITIAL ENCOUNTER: ICD-10-CM

## 2018-02-22 PROCEDURE — 99203 OFFICE O/P NEW LOW 30 MIN: CPT

## 2018-03-03 LAB
CULTURE RESULTS: SIGNIFICANT CHANGE UP
SPECIMEN SOURCE: SIGNIFICANT CHANGE UP

## 2018-03-05 ENCOUNTER — APPOINTMENT (OUTPATIENT)
Dept: INTERNAL MEDICINE | Facility: CLINIC | Age: 48
End: 2018-03-05
Payer: COMMERCIAL

## 2018-03-05 ENCOUNTER — NON-APPOINTMENT (OUTPATIENT)
Age: 48
End: 2018-03-05

## 2018-03-05 VITALS
BODY MASS INDEX: 22.22 KG/M2 | HEIGHT: 69 IN | DIASTOLIC BLOOD PRESSURE: 64 MMHG | HEART RATE: 63 BPM | SYSTOLIC BLOOD PRESSURE: 106 MMHG | WEIGHT: 150 LBS | TEMPERATURE: 98.4 F | RESPIRATION RATE: 12 BRPM | OXYGEN SATURATION: 96 %

## 2018-03-05 DIAGNOSIS — M54.9 DORSALGIA, UNSPECIFIED: ICD-10-CM

## 2018-03-05 PROCEDURE — 99214 OFFICE O/P EST MOD 30 MIN: CPT

## 2018-03-05 PROCEDURE — 93000 ELECTROCARDIOGRAM COMPLETE: CPT

## 2018-03-06 ENCOUNTER — RESULT CHARGE (OUTPATIENT)
Age: 48
End: 2018-03-06

## 2018-03-06 ENCOUNTER — APPOINTMENT (OUTPATIENT)
Dept: CARDIOLOGY | Facility: CLINIC | Age: 48
End: 2018-03-06
Payer: COMMERCIAL

## 2018-03-06 VITALS — HEART RATE: 75 BPM | SYSTOLIC BLOOD PRESSURE: 122 MMHG | DIASTOLIC BLOOD PRESSURE: 74 MMHG

## 2018-03-06 VITALS
SYSTOLIC BLOOD PRESSURE: 128 MMHG | DIASTOLIC BLOOD PRESSURE: 70 MMHG | OXYGEN SATURATION: 98 % | HEART RATE: 65 BPM | TEMPERATURE: 98.1 F | RESPIRATION RATE: 12 BRPM | BODY MASS INDEX: 22.22 KG/M2 | HEIGHT: 69 IN | WEIGHT: 150 LBS

## 2018-03-06 DIAGNOSIS — Z86.19 PERSONAL HISTORY OF OTHER INFECTIOUS AND PARASITIC DISEASES: ICD-10-CM

## 2018-03-06 DIAGNOSIS — I49.9 CARDIAC ARRHYTHMIA, UNSPECIFIED: ICD-10-CM

## 2018-03-06 PROCEDURE — 93224 XTRNL ECG REC UP TO 48 HRS: CPT

## 2018-03-06 PROCEDURE — 99203 OFFICE O/P NEW LOW 30 MIN: CPT | Mod: 25

## 2018-03-12 LAB
ALBUMIN SERPL ELPH-MCNC: 4.5 G/DL
ALP BLD-CCNC: 39 U/L
ALT SERPL-CCNC: 27 U/L
ANION GAP SERPL CALC-SCNC: 15 MMOL/L
APPEARANCE: CLEAR
AST SERPL-CCNC: 36 U/L
BILIRUB SERPL-MCNC: 0.5 MG/DL
BILIRUBIN URINE: NEGATIVE
BLOOD URINE: NEGATIVE
BUN SERPL-MCNC: 21 MG/DL
CALCIUM SERPL-MCNC: 9.7 MG/DL
CHLORIDE SERPL-SCNC: 101 MMOL/L
CO2 SERPL-SCNC: 25 MMOL/L
COLOR: YELLOW
CREAT SERPL-MCNC: 1.24 MG/DL
GLUCOSE QUALITATIVE U: NEGATIVE MG/DL
GLUCOSE SERPL-MCNC: 73 MG/DL
KETONES URINE: NEGATIVE
LEUKOCYTE ESTERASE URINE: NEGATIVE
NITRITE URINE: NEGATIVE
PH URINE: 6
POTASSIUM SERPL-SCNC: 4.1 MMOL/L
PROT SERPL-MCNC: 7.4 G/DL
PROTEIN URINE: NEGATIVE MG/DL
SODIUM SERPL-SCNC: 141 MMOL/L
SPECIFIC GRAVITY URINE: 1.02
UROBILINOGEN URINE: NEGATIVE MG/DL

## 2018-03-13 LAB
BASOPHILS # BLD AUTO: 0.06 K/UL
BASOPHILS NFR BLD AUTO: 0.8 %
EOSINOPHIL # BLD AUTO: 0.39 K/UL
EOSINOPHIL NFR BLD AUTO: 5.5 %
HCT VFR BLD CALC: 43.8 %
HGB BLD-MCNC: 14.4 G/DL
IMM GRANULOCYTES NFR BLD AUTO: 0.1 %
LYMPHOCYTES # BLD AUTO: 2.2 K/UL
LYMPHOCYTES NFR BLD AUTO: 31.2 %
MAN DIFF?: NORMAL
MCHC RBC-ENTMCNC: 29.6 PG
MCHC RBC-ENTMCNC: 32.9 GM/DL
MCV RBC AUTO: 89.9 FL
MONOCYTES # BLD AUTO: 0.7 K/UL
MONOCYTES NFR BLD AUTO: 9.9 %
NEUTROPHILS # BLD AUTO: 3.7 K/UL
NEUTROPHILS NFR BLD AUTO: 52.5 %
PLATELET # BLD AUTO: 236 K/UL
RBC # BLD: 4.87 M/UL
RBC # FLD: 13 %
WBC # FLD AUTO: 7.06 K/UL

## 2018-03-14 ENCOUNTER — NON-APPOINTMENT (OUTPATIENT)
Age: 48
End: 2018-03-14

## 2018-04-20 ENCOUNTER — APPOINTMENT (OUTPATIENT)
Dept: NEUROLOGY | Facility: CLINIC | Age: 48
End: 2018-04-20
Payer: COMMERCIAL

## 2018-04-20 VITALS
DIASTOLIC BLOOD PRESSURE: 80 MMHG | HEART RATE: 68 BPM | HEIGHT: 69 IN | BODY MASS INDEX: 22.22 KG/M2 | WEIGHT: 150 LBS | SYSTOLIC BLOOD PRESSURE: 137 MMHG

## 2018-04-20 DIAGNOSIS — Z78.9 OTHER SPECIFIED HEALTH STATUS: ICD-10-CM

## 2018-04-20 PROCEDURE — 99215 OFFICE O/P EST HI 40 MIN: CPT

## 2018-04-26 ENCOUNTER — FORM ENCOUNTER (OUTPATIENT)
Age: 48
End: 2018-04-26

## 2018-04-27 ENCOUNTER — APPOINTMENT (OUTPATIENT)
Dept: MRI IMAGING | Facility: CLINIC | Age: 48
End: 2018-04-27
Payer: COMMERCIAL

## 2018-04-27 ENCOUNTER — OUTPATIENT (OUTPATIENT)
Dept: OUTPATIENT SERVICES | Facility: HOSPITAL | Age: 48
LOS: 1 days | End: 2018-04-27
Payer: COMMERCIAL

## 2018-04-27 DIAGNOSIS — R29.90 UNSPECIFIED SYMPTOMS AND SIGNS INVOLVING THE NERVOUS SYSTEM: ICD-10-CM

## 2018-04-27 DIAGNOSIS — M54.5 LOW BACK PAIN: ICD-10-CM

## 2018-04-27 PROCEDURE — 72148 MRI LUMBAR SPINE W/O DYE: CPT

## 2018-04-27 PROCEDURE — 72148 MRI LUMBAR SPINE W/O DYE: CPT | Mod: 26

## 2018-05-07 ENCOUNTER — FORM ENCOUNTER (OUTPATIENT)
Age: 48
End: 2018-05-07

## 2018-05-08 ENCOUNTER — OTHER (OUTPATIENT)
Age: 48
End: 2018-05-08

## 2018-05-08 ENCOUNTER — OUTPATIENT (OUTPATIENT)
Dept: OUTPATIENT SERVICES | Facility: HOSPITAL | Age: 48
LOS: 1 days | End: 2018-05-08
Payer: COMMERCIAL

## 2018-05-08 DIAGNOSIS — J16.0 CHLAMYDIAL PNEUMONIA: ICD-10-CM

## 2018-05-08 PROCEDURE — 36569 INSJ PICC 5 YR+ W/O IMAGING: CPT

## 2018-05-08 PROCEDURE — 77001 FLUOROGUIDE FOR VEIN DEVICE: CPT | Mod: 26

## 2018-05-08 PROCEDURE — 77001 FLUOROGUIDE FOR VEIN DEVICE: CPT

## 2018-05-08 PROCEDURE — 76937 US GUIDE VASCULAR ACCESS: CPT | Mod: 26

## 2018-05-08 PROCEDURE — 76937 US GUIDE VASCULAR ACCESS: CPT

## 2018-05-08 PROCEDURE — C1751: CPT

## 2018-05-09 LAB
BASOPHILS # BLD AUTO: 0.1 K/UL
BASOPHILS NFR BLD AUTO: 1.2 %
EOSINOPHIL # BLD AUTO: 0.3 K/UL
EOSINOPHIL NFR BLD AUTO: 6 %
HCT VFR BLD CALC: 48.9 %
HGB BLD-MCNC: 16.1 G/DL
INR PPP: 1.13 RATIO
LYMPHOCYTES # BLD AUTO: 1.7 K/UL
LYMPHOCYTES NFR BLD AUTO: 33.2 %
MAN DIFF?: NO
MCHC RBC-ENTMCNC: 30.1 PG
MCHC RBC-ENTMCNC: 33 GM/DL
MCV RBC AUTO: 91.3 FL
MONOCYTES # BLD AUTO: 0.4 K/UL
MONOCYTES NFR BLD AUTO: 8.8 %
NEUTROPHILS # BLD AUTO: 2.6 K/UL
NEUTROPHILS NFR BLD AUTO: 51 %
PLATELET # BLD AUTO: 219 K/UL
PT BLD: 12.3 SEC
RBC # BLD: 5.35 M/UL
RBC # FLD: 11.3 %
WBC # FLD AUTO: 5 K/UL

## 2018-05-14 DIAGNOSIS — A69.20 LYME DISEASE, UNSPECIFIED: ICD-10-CM

## 2018-05-14 DIAGNOSIS — Z45.2 ENCOUNTER FOR ADJUSTMENT AND MANAGEMENT OF VASCULAR ACCESS DEVICE: ICD-10-CM

## 2018-07-23 ENCOUNTER — APPOINTMENT (OUTPATIENT)
Dept: NEUROSURGERY | Facility: CLINIC | Age: 48
End: 2018-07-23
Payer: COMMERCIAL

## 2018-07-23 VITALS
HEART RATE: 60 BPM | RESPIRATION RATE: 16 BRPM | OXYGEN SATURATION: 96 % | WEIGHT: 150 LBS | BODY MASS INDEX: 22.22 KG/M2 | SYSTOLIC BLOOD PRESSURE: 122 MMHG | HEIGHT: 69 IN | DIASTOLIC BLOOD PRESSURE: 78 MMHG | TEMPERATURE: 98.1 F

## 2018-07-23 PROCEDURE — 99215 OFFICE O/P EST HI 40 MIN: CPT

## 2018-08-06 ENCOUNTER — APPOINTMENT (OUTPATIENT)
Dept: NEUROSURGERY | Facility: CLINIC | Age: 48
End: 2018-08-06
Payer: COMMERCIAL

## 2018-08-06 VITALS
SYSTOLIC BLOOD PRESSURE: 130 MMHG | RESPIRATION RATE: 16 BRPM | TEMPERATURE: 98 F | HEART RATE: 69 BPM | OXYGEN SATURATION: 97 % | BODY MASS INDEX: 22.22 KG/M2 | HEIGHT: 69 IN | WEIGHT: 150 LBS | DIASTOLIC BLOOD PRESSURE: 73 MMHG

## 2018-08-06 PROCEDURE — 99214 OFFICE O/P EST MOD 30 MIN: CPT

## 2018-08-14 ENCOUNTER — OUTPATIENT (OUTPATIENT)
Dept: OUTPATIENT SERVICES | Facility: HOSPITAL | Age: 48
LOS: 1 days | End: 2018-08-14
Payer: COMMERCIAL

## 2018-08-14 VITALS
WEIGHT: 149.91 LBS | OXYGEN SATURATION: 98 % | HEIGHT: 69 IN | SYSTOLIC BLOOD PRESSURE: 108 MMHG | HEART RATE: 62 BPM | DIASTOLIC BLOOD PRESSURE: 65 MMHG | TEMPERATURE: 98 F | RESPIRATION RATE: 16 BRPM

## 2018-08-14 DIAGNOSIS — Z98.890 OTHER SPECIFIED POSTPROCEDURAL STATES: Chronic | ICD-10-CM

## 2018-08-14 DIAGNOSIS — Z01.818 ENCOUNTER FOR OTHER PREPROCEDURAL EXAMINATION: ICD-10-CM

## 2018-08-14 DIAGNOSIS — I67.1 CEREBRAL ANEURYSM, NONRUPTURED: ICD-10-CM

## 2018-08-14 LAB
ANION GAP SERPL CALC-SCNC: 12 MMOL/L — SIGNIFICANT CHANGE UP (ref 5–17)
APTT BLD: 32.5 SEC — SIGNIFICANT CHANGE UP (ref 27.5–37.4)
BLD GP AB SCN SERPL QL: NEGATIVE — SIGNIFICANT CHANGE UP
BUN SERPL-MCNC: 14 MG/DL — SIGNIFICANT CHANGE UP (ref 7–23)
CALCIUM SERPL-MCNC: 9.3 MG/DL — SIGNIFICANT CHANGE UP (ref 8.4–10.5)
CHLORIDE SERPL-SCNC: 101 MMOL/L — SIGNIFICANT CHANGE UP (ref 96–108)
CO2 SERPL-SCNC: 27 MMOL/L — SIGNIFICANT CHANGE UP (ref 22–31)
CREAT SERPL-MCNC: 0.99 MG/DL — SIGNIFICANT CHANGE UP (ref 0.5–1.3)
GLUCOSE SERPL-MCNC: 80 MG/DL — SIGNIFICANT CHANGE UP (ref 70–99)
HCT VFR BLD CALC: 44.2 % — SIGNIFICANT CHANGE UP (ref 39–50)
HGB BLD-MCNC: 15.1 G/DL — SIGNIFICANT CHANGE UP (ref 13–17)
INR BLD: 1.06 RATIO — SIGNIFICANT CHANGE UP (ref 0.88–1.16)
MCHC RBC-ENTMCNC: 30.2 PG — SIGNIFICANT CHANGE UP (ref 27–34)
MCHC RBC-ENTMCNC: 34.2 GM/DL — SIGNIFICANT CHANGE UP (ref 32–36)
MCV RBC AUTO: 88.4 FL — SIGNIFICANT CHANGE UP (ref 80–100)
PLATELET # BLD AUTO: 205 K/UL — SIGNIFICANT CHANGE UP (ref 150–400)
POTASSIUM SERPL-MCNC: 4.4 MMOL/L — SIGNIFICANT CHANGE UP (ref 3.5–5.3)
POTASSIUM SERPL-SCNC: 4.4 MMOL/L — SIGNIFICANT CHANGE UP (ref 3.5–5.3)
PROTHROM AB SERPL-ACNC: 12 SEC — SIGNIFICANT CHANGE UP (ref 10–13.1)
RBC # BLD: 5 M/UL — SIGNIFICANT CHANGE UP (ref 4.2–5.8)
RBC # FLD: 12.6 % — SIGNIFICANT CHANGE UP (ref 10.3–14.5)
RH IG SCN BLD-IMP: POSITIVE — SIGNIFICANT CHANGE UP
SODIUM SERPL-SCNC: 140 MMOL/L — SIGNIFICANT CHANGE UP (ref 135–145)
WBC # BLD: 4.2 K/UL — SIGNIFICANT CHANGE UP (ref 3.8–10.5)
WBC # FLD AUTO: 4.2 K/UL — SIGNIFICANT CHANGE UP (ref 3.8–10.5)

## 2018-08-14 PROCEDURE — 80048 BASIC METABOLIC PNL TOTAL CA: CPT

## 2018-08-14 PROCEDURE — 85610 PROTHROMBIN TIME: CPT

## 2018-08-14 PROCEDURE — 86900 BLOOD TYPING SEROLOGIC ABO: CPT

## 2018-08-14 PROCEDURE — 85027 COMPLETE CBC AUTOMATED: CPT

## 2018-08-14 PROCEDURE — 85730 THROMBOPLASTIN TIME PARTIAL: CPT

## 2018-08-14 PROCEDURE — 86901 BLOOD TYPING SEROLOGIC RH(D): CPT

## 2018-08-14 PROCEDURE — 86850 RBC ANTIBODY SCREEN: CPT

## 2018-08-14 PROCEDURE — G0463: CPT

## 2018-08-14 NOTE — H&P PST ADULT - PMH
Hypotestosteronism DAVF (dural arteriovenous fistula)  dx: ' 2017  Hypotestosteronism    Lyme disease  suspected. Treated 5/2018 to 8/2018 with I.V. Antibiotic via PIC Line

## 2018-08-14 NOTE — H&P PST ADULT - HISTORY OF PRESENT ILLNESS
This is a 49 y/o male with PMH; dx ('17): DAVF ( Dural Arteriovenous Fistula: Right occipital region. Now scheduled: Cerebral Angiogram/ Embolization.

## 2018-08-15 PROBLEM — E34.9 ENDOCRINE DISORDER, UNSPECIFIED: Chronic | Status: ACTIVE | Noted: 2017-12-12

## 2018-08-15 PROBLEM — I67.1 CEREBRAL ANEURYSM, NONRUPTURED: Chronic | Status: ACTIVE | Noted: 2018-08-14

## 2018-08-21 ENCOUNTER — APPOINTMENT (OUTPATIENT)
Dept: NEUROSURGERY | Facility: HOSPITAL | Age: 48
End: 2018-08-21
Payer: COMMERCIAL

## 2018-08-21 ENCOUNTER — INPATIENT (INPATIENT)
Facility: HOSPITAL | Age: 48
LOS: 5 days | Discharge: HOME | DRG: 270 | End: 2018-08-27
Attending: NEUROLOGICAL SURGERY | Admitting: NEUROLOGICAL SURGERY
Payer: COMMERCIAL

## 2018-08-21 ENCOUNTER — TRANSCRIPTION ENCOUNTER (OUTPATIENT)
Age: 48
End: 2018-08-21

## 2018-08-21 VITALS
OXYGEN SATURATION: 100 % | TEMPERATURE: 98 F | HEIGHT: 69 IN | RESPIRATION RATE: 12 BRPM | WEIGHT: 139.99 LBS | HEART RATE: 78 BPM

## 2018-08-21 DIAGNOSIS — Z98.890 OTHER SPECIFIED POSTPROCEDURAL STATES: Chronic | ICD-10-CM

## 2018-08-21 DIAGNOSIS — I67.1 CEREBRAL ANEURYSM, NONRUPTURED: ICD-10-CM

## 2018-08-21 LAB
ANION GAP SERPL CALC-SCNC: 16 MMOL/L — SIGNIFICANT CHANGE UP (ref 5–17)
BUN SERPL-MCNC: 14 MG/DL — SIGNIFICANT CHANGE UP (ref 7–23)
CALCIUM SERPL-MCNC: 8.7 MG/DL — SIGNIFICANT CHANGE UP (ref 8.4–10.5)
CHLORIDE SERPL-SCNC: 103 MMOL/L — SIGNIFICANT CHANGE UP (ref 96–108)
CO2 SERPL-SCNC: 19 MMOL/L — LOW (ref 22–31)
CREAT SERPL-MCNC: 0.96 MG/DL — SIGNIFICANT CHANGE UP (ref 0.5–1.3)
GLUCOSE SERPL-MCNC: 156 MG/DL — HIGH (ref 70–99)
HCT VFR BLD CALC: 41.6 % — SIGNIFICANT CHANGE UP (ref 39–50)
HGB BLD-MCNC: 14.4 G/DL — SIGNIFICANT CHANGE UP (ref 13–17)
MAGNESIUM SERPL-MCNC: 1.8 MG/DL — SIGNIFICANT CHANGE UP (ref 1.6–2.6)
MCHC RBC-ENTMCNC: 30.5 PG — SIGNIFICANT CHANGE UP (ref 27–34)
MCHC RBC-ENTMCNC: 34.6 GM/DL — SIGNIFICANT CHANGE UP (ref 32–36)
MCV RBC AUTO: 88.1 FL — SIGNIFICANT CHANGE UP (ref 80–100)
PHOSPHATE SERPL-MCNC: 3.9 MG/DL — SIGNIFICANT CHANGE UP (ref 2.5–4.5)
PLATELET # BLD AUTO: 162 K/UL — SIGNIFICANT CHANGE UP (ref 150–400)
POTASSIUM SERPL-MCNC: 4.4 MMOL/L — SIGNIFICANT CHANGE UP (ref 3.5–5.3)
POTASSIUM SERPL-SCNC: 4.4 MMOL/L — SIGNIFICANT CHANGE UP (ref 3.5–5.3)
RBC # BLD: 4.72 M/UL — SIGNIFICANT CHANGE UP (ref 4.2–5.8)
RBC # FLD: 11.6 % — SIGNIFICANT CHANGE UP (ref 10.3–14.5)
SODIUM SERPL-SCNC: 138 MMOL/L — SIGNIFICANT CHANGE UP (ref 135–145)
WBC # BLD: 9.1 K/UL — SIGNIFICANT CHANGE UP (ref 3.8–10.5)
WBC # FLD AUTO: 9.1 K/UL — SIGNIFICANT CHANGE UP (ref 3.8–10.5)

## 2018-08-21 PROCEDURE — 75894 X-RAYS TRANSCATH THERAPY: CPT | Mod: 26

## 2018-08-21 PROCEDURE — 36224 PLACE CATH CAROTD ART: CPT | Mod: 50

## 2018-08-21 PROCEDURE — 99291 CRITICAL CARE FIRST HOUR: CPT

## 2018-08-21 PROCEDURE — 71045 X-RAY EXAM CHEST 1 VIEW: CPT | Mod: 26

## 2018-08-21 PROCEDURE — 75860 VEIN X-RAY NECK: CPT | Mod: 26,59

## 2018-08-21 PROCEDURE — 36012 PLACE CATHETER IN VEIN: CPT | Mod: RT

## 2018-08-21 PROCEDURE — 99292 CRITICAL CARE ADDL 30 MIN: CPT

## 2018-08-21 PROCEDURE — 36226 PLACE CATH VERTEBRAL ART: CPT | Mod: 50

## 2018-08-21 PROCEDURE — 75898 FOLLOW-UP ANGIOGRAPHY: CPT | Mod: 26

## 2018-08-21 PROCEDURE — 36227 PLACE CATH XTRNL CAROTID: CPT | Mod: 50

## 2018-08-21 PROCEDURE — 75870 VEIN X-RAY SKULL: CPT | Mod: 26,59

## 2018-08-21 PROCEDURE — 61624 TCAT PERM OCCLS/EMBOLJ CNS: CPT

## 2018-08-21 RX ORDER — ACETAMINOPHEN 500 MG
650 TABLET ORAL EVERY 6 HOURS
Qty: 0 | Refills: 0 | Status: DISCONTINUED | OUTPATIENT
Start: 2018-08-21 | End: 2018-08-27

## 2018-08-21 RX ORDER — SCOPALAMINE 1 MG/3D
1 PATCH, EXTENDED RELEASE TRANSDERMAL ONCE
Qty: 0 | Refills: 0 | Status: COMPLETED | OUTPATIENT
Start: 2018-08-21 | End: 2018-08-21

## 2018-08-21 RX ORDER — MAGNESIUM SULFATE 500 MG/ML
1 VIAL (ML) INJECTION ONCE
Qty: 0 | Refills: 0 | Status: COMPLETED | OUTPATIENT
Start: 2018-08-21 | End: 2018-08-22

## 2018-08-21 RX ORDER — ACETAMINOPHEN 500 MG
1000 TABLET ORAL ONCE
Qty: 0 | Refills: 0 | Status: COMPLETED | OUTPATIENT
Start: 2018-08-21 | End: 2018-08-21

## 2018-08-21 RX ORDER — HYDROMORPHONE HYDROCHLORIDE 2 MG/ML
0.5 INJECTION INTRAMUSCULAR; INTRAVENOUS; SUBCUTANEOUS ONCE
Qty: 0 | Refills: 0 | Status: DISCONTINUED | OUTPATIENT
Start: 2018-08-21 | End: 2018-08-21

## 2018-08-21 RX ORDER — PROCHLORPERAZINE MALEATE 5 MG
10 TABLET ORAL ONCE
Qty: 0 | Refills: 0 | Status: COMPLETED | OUTPATIENT
Start: 2018-08-21 | End: 2018-08-21

## 2018-08-21 RX ORDER — DEXTROSE MONOHYDRATE, SODIUM CHLORIDE, AND POTASSIUM CHLORIDE 50; .745; 4.5 G/1000ML; G/1000ML; G/1000ML
1000 INJECTION, SOLUTION INTRAVENOUS
Qty: 0 | Refills: 0 | Status: DISCONTINUED | OUTPATIENT
Start: 2018-08-21 | End: 2018-08-23

## 2018-08-21 RX ORDER — HALOPERIDOL DECANOATE 100 MG/ML
1 INJECTION INTRAMUSCULAR ONCE
Qty: 0 | Refills: 0 | Status: COMPLETED | OUTPATIENT
Start: 2018-08-21 | End: 2018-08-21

## 2018-08-21 RX ORDER — ONDANSETRON 8 MG/1
4 TABLET, FILM COATED ORAL EVERY 6 HOURS
Qty: 0 | Refills: 0 | Status: DISCONTINUED | OUTPATIENT
Start: 2018-08-21 | End: 2018-08-27

## 2018-08-21 RX ORDER — OXYCODONE HYDROCHLORIDE 5 MG/1
5 TABLET ORAL EVERY 4 HOURS
Qty: 0 | Refills: 0 | Status: DISCONTINUED | OUTPATIENT
Start: 2018-08-21 | End: 2018-08-27

## 2018-08-21 RX ORDER — DOCUSATE SODIUM 100 MG
100 CAPSULE ORAL
Qty: 0 | Refills: 0 | Status: DISCONTINUED | OUTPATIENT
Start: 2018-08-21 | End: 2018-08-27

## 2018-08-21 RX ORDER — OXYCODONE HYDROCHLORIDE 5 MG/1
10 TABLET ORAL EVERY 4 HOURS
Qty: 0 | Refills: 0 | Status: DISCONTINUED | OUTPATIENT
Start: 2018-08-21 | End: 2018-08-26

## 2018-08-21 RX ORDER — GRANISETRON HYDROCHLORIDE 1 MG/1
1 TABLET, FILM COATED ORAL ONCE
Qty: 0 | Refills: 0 | Status: COMPLETED | OUTPATIENT
Start: 2018-08-21 | End: 2018-08-22

## 2018-08-21 RX ORDER — PROCHLORPERAZINE MALEATE 5 MG
10 TABLET ORAL ONCE
Qty: 0 | Refills: 0 | Status: DISCONTINUED | OUTPATIENT
Start: 2018-08-21 | End: 2018-08-21

## 2018-08-21 RX ORDER — CEFAZOLIN SODIUM 1 G
2000 VIAL (EA) INJECTION ONCE
Qty: 0 | Refills: 0 | Status: COMPLETED | OUTPATIENT
Start: 2018-08-21 | End: 2018-08-21

## 2018-08-21 RX ORDER — METOCLOPRAMIDE HCL 10 MG
10 TABLET ORAL EVERY 6 HOURS
Qty: 0 | Refills: 0 | Status: DISCONTINUED | OUTPATIENT
Start: 2018-08-21 | End: 2018-08-26

## 2018-08-21 RX ORDER — SENNA PLUS 8.6 MG/1
2 TABLET ORAL AT BEDTIME
Qty: 0 | Refills: 0 | Status: DISCONTINUED | OUTPATIENT
Start: 2018-08-21 | End: 2018-08-27

## 2018-08-21 RX ADMIN — Medication 1000 MILLIGRAM(S): at 15:15

## 2018-08-21 RX ADMIN — SCOPALAMINE 1 PATCH: 1 PATCH, EXTENDED RELEASE TRANSDERMAL at 21:41

## 2018-08-21 RX ADMIN — HYDROMORPHONE HYDROCHLORIDE 0.5 MILLIGRAM(S): 2 INJECTION INTRAMUSCULAR; INTRAVENOUS; SUBCUTANEOUS at 20:55

## 2018-08-21 RX ADMIN — HYDROMORPHONE HYDROCHLORIDE 0.5 MILLIGRAM(S): 2 INJECTION INTRAMUSCULAR; INTRAVENOUS; SUBCUTANEOUS at 15:30

## 2018-08-21 RX ADMIN — Medication 10 MILLIGRAM(S): at 15:30

## 2018-08-21 RX ADMIN — HYDROMORPHONE HYDROCHLORIDE 0.5 MILLIGRAM(S): 2 INJECTION INTRAMUSCULAR; INTRAVENOUS; SUBCUTANEOUS at 15:45

## 2018-08-21 RX ADMIN — DEXTROSE MONOHYDRATE, SODIUM CHLORIDE, AND POTASSIUM CHLORIDE 75 MILLILITER(S): 50; .745; 4.5 INJECTION, SOLUTION INTRAVENOUS at 17:57

## 2018-08-21 RX ADMIN — Medication 400 MILLIGRAM(S): at 15:00

## 2018-08-21 RX ADMIN — Medication 100 MILLIGRAM(S): at 18:13

## 2018-08-21 RX ADMIN — HALOPERIDOL DECANOATE 1 MILLIGRAM(S): 100 INJECTION INTRAMUSCULAR at 22:25

## 2018-08-21 RX ADMIN — ONDANSETRON 4 MILLIGRAM(S): 8 TABLET, FILM COATED ORAL at 15:05

## 2018-08-21 RX ADMIN — Medication 10 MILLIGRAM(S): at 19:35

## 2018-08-21 RX ADMIN — HYDROMORPHONE HYDROCHLORIDE 0.5 MILLIGRAM(S): 2 INJECTION INTRAMUSCULAR; INTRAVENOUS; SUBCUTANEOUS at 21:10

## 2018-08-21 RX ADMIN — Medication 10 MILLIGRAM(S): at 20:50

## 2018-08-21 NOTE — DISCHARGE NOTE ADULT - HOSPITAL COURSE
48 yr old male with a couple months of headaches.  Patient originally treated for lymes disease from Dr. Knight in [place] from May with IV antibiotics.  He started seeing floaters.  Had a MRI which was negative.  eventually had a MRA of the head the showed a dural AVM.  Patient was admitted 8/21/18 and had a cerebral angiogram and embolization of right occipital dural avm including middle meningeal artery and occipital artery. Patient still had visual changes where he saw floaters.  Patient was offered a opthomology consult in the hospital and he refused.  He preferred to see Dr. Valverde as an outpatient as he has seen him pre op.  Patient on post operative MRI shows venous congestion.  He was treated with heparin drip.  Repeat MRI on 8/25/18 shows improvement, so Dr. Rojas stopped the drip.  Patient had her IV antibiotics for lyme continue but on 8/26/18 Dr. Knight said stop all antibiotics and if flair up starts again he will see the patient in the office.  Patient was seen by physical and occupational therapy and recommended for home with no Physical Therapy needs and outpatient vision therapy.  On day of discharge patient is medically and neurologically stable for discharge.

## 2018-08-21 NOTE — DISCHARGE NOTE ADULT - PLAN OF CARE
8/21/18 cerebral angiogram of right occipital dural avm Follow up with Dr. Rojas in 1 week.  Call office for appointment. antibiotics have been stopped.  If flair up occurs follow up with your infectious disease doctor, Dr. Stew Knight

## 2018-08-21 NOTE — DISCHARGE NOTE ADULT - PATIENT PORTAL LINK FT
You can access the [facility] Patient Portal, offered by [facility], by registering with the following website: [email or web address]

## 2018-08-21 NOTE — DISCHARGE NOTE ADULT - MEDICATION SUMMARY - MEDICATIONS TO STOP TAKING
I will STOP taking the medications listed below when I get home from the hospital:    Rocephin  -- 1 dose(s) intravenous once a day: long term treatment for suspected Lymes Disease ( started 5/2018; ended 8/2018)    Zithromax  -- 1 dose(s) intravenous once a day: longterm treatment for suspected Lymes Disease: started 5/2018 ; ended 8/2018

## 2018-08-21 NOTE — DISCHARGE NOTE ADULT - MEDICATION SUMMARY - MEDICATIONS TO TAKE
I will START or STAY ON the medications listed below when I get home from the hospital:    acetaminophen 325 mg oral tablet  -- 2 tab(s) by mouth every 6 hours, As needed, For Temp greater than 38 C (100.4 F)  -- Indication: For for fever    acetaminophen 325 mg oral tablet  -- 2 tab(s) by mouth every 6 hours, As needed, Mild Pain (1 - 3)  -- Indication: For for pain    oxyCODONE 5 mg oral tablet  -- 1 tab(s) by mouth every 4 hours, As needed, Moderate Pain (4 - 6) MDD:6  -- Indication: For for pain    ALPRAZolam 0.25 mg oral tablet  -- 1 tab(s) by mouth every 8 hours, As needed, anxiety MDD:3  -- Indication: For anxiety    docusate sodium 100 mg oral capsule  -- 1 cap(s) by mouth 2 times a day  -- Indication: For constipation    senna oral tablet  -- 2 tab(s) by mouth once a day (at bedtime)  -- Indication: For constipation    melatonin 3 mg oral tablet  -- 1 tab(s) by mouth once a day (at bedtime)  -- Indication: For insomnia    Multiple Vitamins oral tablet  -- 1 tab(s) by mouth once a day  -- Indication: For vitamin

## 2018-08-21 NOTE — PROGRESS NOTE ADULT - ASSESSMENT
ASSESSMENT:   Dural fistula s/p ryne embolization, POD 1.    NEURO:  Pain control, avoid oversedation  CTh am   Safeguard in place  Activity: [x] OOB as tolerated [] Bedrest [x] PT tomorrow    PULM:  IS    CV:  -150mmHg,    RENAL:  IVF while NPO    GI:  Diet: Dysphagia screen and then advance diet as tolerated  GI prophylaxis [x] not indicated [] PPI [] other:  Bowel regimen [x] colace [x] senna [] other:    ENDO:   Goal euglycemia (-180)    HEME/ONC:  VTE prophylaxis: [x] SCDs [x] chemoprophylaxis Lovenox tonight    ID:  Deanna-op antibiotics    SOCIAL/FAMILY:  [x] awaiting [] updated at bedside [] family meeting    CODE STATUS:  [x] Full Code [] DNR [] DNI [] Palliative/Comfort Care    DISPOSITION:  [x] ICU [] Stroke Unit [] Floor [] EMU [] RCU [] PCU    [x] Patient is at high risk of neurologic deterioration/death due to:     Time spent: 45 critical care minutes ASSESSMENT:   Dural fistula s/p ryne embolization, POD 1.    NEURO:  Pain control, avoid oversedation  CTh am   Safeguard in place  Activity: [x] OOB as tolerated [] Bedrest [x] PT tomorrow    PULM:  IS    CV:  -150mmHg,    RENAL:  IVF while NPO    GI:  Diet: Dysphagia screen and then advance diet as tolerated  GI prophylaxis [x] not indicated [] PPI [] other:  Bowel regimen [x] colace [x] senna [] other:    ENDO:   Goal euglycemia (-180)    HEME/ONC:  VTE prophylaxis: [x] SCDs [x] chemoprophylaxis Lovenox tonight    ID:  Deanna-op antibiotics    SOCIAL/FAMILY:  [x] awaiting [] updated at bedside [] family meeting    CODE STATUS:  [x] Full Code [] DNR [] DNI [] Palliative/Comfort Care    DISPOSITION:  [x] ICU [] Stroke Unit [] Floor [] EMU [] RCU [] PCU    [x] Patient is at high risk of neurologic deterioration/death due to: post operative hemorrhage, stroke    Time spent: 45 critical care minutes

## 2018-08-21 NOTE — DISCHARGE NOTE ADULT - CARE PROVIDER_API CALL
King Rojas), Neurological Surgery  [address] [place] [address]  Phone: [phone]  Fax: [phone]

## 2018-08-21 NOTE — DISCHARGE NOTE ADULT - CARE PLAN
Principal Discharge DX:	DAVF (dural arteriovenous fistula)  Goal:	8/21/18 cerebral angiogram of right occipital dural avm  Assessment and plan of treatment:	Follow up with Dr. Rojas in 1 week.  Call office for appointment.  Secondary Diagnosis:	Lyme disease  Goal:	antibiotics have been stopped.  If flair up occurs follow up with your infectious disease doctor, Dr. Stew Knight

## 2018-08-21 NOTE — PROGRESS NOTE ADULT - ASSESSMENT
Dural AVM, post-operative day 0 from angio/embo  - MRI/A  - Pain control  - -140mmHg  - Advance diet as tolerated  - IVF given recent contrast load    45 minutes critical care time

## 2018-08-21 NOTE — CHART NOTE - NSCHARTNOTEFT_GEN_A_CORE
Interventional Neuro- Radiology   Procedure Note      Procedure: Selective Cerebral Angiography and embolization   Pre- Procedure Diagnosis: Dural fistula   Post- Procedure Diagnosis: Dural fistula s/p ryne embolization ( 9.0cc of ryne 18)    : Dr. Bob MD,   Dr. Arcadio MD  Fellow: Dr. Tavares MD  Resident: Dr. Naveen MD  Physician Assistant: Donna Restrepo PA-C, Quin Mclain PA-C    RN: Selam/ Triny     Anesthesia: Dr. Angelique MD   (general anesthesia)      I/Os:  Fluids: 1500cc  Stone: 2100cc   Contrast: 198cc  Estimated Blood Loss: <10cc    Vitals: BP=  124/63         HR=  50        SpO2=  99%    Preliminary Report:  Under general anesthesia, using a 6Fr sheath to the right femoral artery and 5Fr sheath to left femoral vein examination of right internal carotid artery/ right external carotid artery via selective cerebral angiography demonstrates right occipital dural malformation s/p embolization using 9.0cc of ryne 18. ( Official note to follow).    Patient tolerated procedure well, vital signs stable, hemodynamically stable, no change in neurological status compared to baseline. Results discussed with patient and their family. Groin sheathes d/c'ed, manual compression held to hemostasis, no active bleeding, no hematoma, quick clot and safeguard balloon dressing applied to bilateral groins at 14:30h. Patient transferred to NSCU for further care/ monitoring.     Donna Restrepo PA-C  [phone]

## 2018-08-21 NOTE — DISCHARGE NOTE ADULT - NS AS ACTIVITY OBS
do not return to walk or drive until cleared by Dr. Rojas/Do not make important decisions/Do not drive or operate machinery/Walking-Outdoors allowed/Stairs allowed/No Heavy lifting/straining/Bathing allowed/Showering allowed/Walking-Indoors allowed

## 2018-08-21 NOTE — CHART NOTE - NSCHARTNOTEFT_GEN_A_CORE
Interventional Neuro Radiology  Pre-Procedure Note    This is a 49yo left hand dominant male who underwent work up for Lyme disease, associated with symptoms of headaches, and tinnitus. Patient underwent selective cerebral angiography in 12/2017 which demonstrated right occipital dural malformation with pial venous drainage, with pial (PCA) donation. Patient presents today to neuro IR for selective cerebral angiography and embolization.     Neuro Exam: Awake and alert, oriented x3, fluent, follows commands, Vanita 5/5    PAST MEDICAL & SURGICAL HISTORY:  Lyme disease: Treated 5/2018 to 8/2018 with I.V. Antibiotic via right UE PICC Line  DAVF (dural arteriovenous fistula)  Hypotestosteronism  Status post colonoscopy: 2013, Negative    Social History:   Denies tobacco use     FAMILY HISTORY:  No pertinent family hx     Allergies:   Avelox (Other)  Cipro (Other)  Levaquin (Other)      Current Medications:   · 	Rocephin: Last Dose Taken:  , 1 dose(s) intravenous once a day: long term treatment for suspected Lyme Disease ( started 5/2018, ended 8/2018)  · 	Zithromax: Last Dose Taken:  , 1 dose(s) intravenous once a day: long term treatment for suspected Lyme Disease: started 5/2018 , ended 8/2018  · 	multivitamin: Last Dose Taken:  , 1 tab(s) orally once a day      Labs:                         15.1   4.20  )-----------( 205      ( 14 Aug 2018 20:37 )             44.2       08-14    140  |  101  |  14  ----------------------------<  80  4.4   |  27  |  0.99    Blood Bank:     Assessment/Plan:   This is a 49yo left hand dominant male presents with dural fistula. Patient presents to neuro-IR for selective cerebral angiography and embolization. Procedure/ risks/ benefits/ goals/ alternatives were explained. Risks include but are not limited to stroke/ vessel injury/ hemorrhage/ groin hematoma. All questions answered. Informed content obtained from patient, family at bedside. Consent placed in chart.    Donna Restrepo PA-C  [phone]

## 2018-08-21 NOTE — PROGRESS NOTE ADULT - SUBJECTIVE AND OBJECTIVE BOX
Summary:  48 year-old man with history of hypotestosteronism and known dural AVM diagnosed in 2017 underwent angio/embo of right occipital dural AVM, including embolization of middle meningeal artery and occipital artery on 8/21/18.    24 Hour Events:  Admitted to NSCU.    Vitals/Orders/Data: [x] Reviewed    Examination:  Awake, alert, fully oriented, PERRL, face symmetric, no drift, full strength, no groin hematoma, good distal pulses

## 2018-08-21 NOTE — PROGRESS NOTE ADULT - SUBJECTIVE AND OBJECTIVE BOX
HPI:  This is a 47 y/o male with PMH of DAVF in Right occipital region. Underwent Cerebral Angiogram/ Embolization. (14 Aug 2018 16:49)    On admission, the patient was:  GCS: 15.    VITALS:  T(C): , Max: 36.4 (08-21-18 @ 15:00)  HR:  (68 - 78)  ABP:  (124/65 - 129/71)  RR:  (12 - 20)  SpO2:  (100% - 100%)    08-21-18 @ 07:01  -  08-21-18 @ 16:22  --------------------------------------------------------  IN: 0 mL / OUT: 350 mL / NET: -350 mL      LABS: reviewed.    IMAGING:   Recent imaging studies were reviewed.    MEDICATIONS:  ceFAZolin   IVPB 2000 milliGRAM(s) IV Intermittent once  docusate sodium 100 milliGRAM(s) Oral two times a day  HYDROmorphone  Injectable 0.5 milliGRAM(s) IV Push once  metoclopramide Injectable 10 milliGRAM(s) IV Push every 6 hours PRN  multivitamin 1 Tablet(s) Oral daily  ondansetron Injectable 4 milliGRAM(s) IV Push every 6 hours PRN  senna 2 Tablet(s) Oral at bedtime  sodium chloride 0.9% with potassium chloride 20 mEq/L 1000 milliLiter(s) IV Continuous <Continuous>    EXAMINATION:  General:  calm, cooperative.      HEENT:  EOMI, PERRLA, face symmetric.  Neuro:  awake, alert, oriented x 3, follows commands, moves all extremities 5/5.  Cards:  regular S1S2.  Respiratory:  no respiratory distress,  CTAB.  Abdomen:  soft  Extremities:  no edema  Skin:  warm/dry

## 2018-08-22 PROBLEM — A69.20 LYME DISEASE, UNSPECIFIED: Chronic | Status: ACTIVE | Noted: 2018-08-14

## 2018-08-22 LAB
ANION GAP SERPL CALC-SCNC: 14 MMOL/L — SIGNIFICANT CHANGE UP (ref 5–17)
BUN SERPL-MCNC: 14 MG/DL — SIGNIFICANT CHANGE UP (ref 7–23)
CALCIUM SERPL-MCNC: 9.8 MG/DL — SIGNIFICANT CHANGE UP (ref 8.4–10.5)
CHLORIDE SERPL-SCNC: 103 MMOL/L — SIGNIFICANT CHANGE UP (ref 96–108)
CO2 SERPL-SCNC: 23 MMOL/L — SIGNIFICANT CHANGE UP (ref 22–31)
CREAT SERPL-MCNC: 1.05 MG/DL — SIGNIFICANT CHANGE UP (ref 0.5–1.3)
GLUCOSE SERPL-MCNC: 141 MG/DL — HIGH (ref 70–99)
HCT VFR BLD CALC: 43.1 % — SIGNIFICANT CHANGE UP (ref 39–50)
HGB BLD-MCNC: 14.8 G/DL — SIGNIFICANT CHANGE UP (ref 13–17)
MAGNESIUM SERPL-MCNC: 2.2 MG/DL — SIGNIFICANT CHANGE UP (ref 1.6–2.6)
MCHC RBC-ENTMCNC: 30.3 PG — SIGNIFICANT CHANGE UP (ref 27–34)
MCHC RBC-ENTMCNC: 34.3 GM/DL — SIGNIFICANT CHANGE UP (ref 32–36)
MCV RBC AUTO: 88.4 FL — SIGNIFICANT CHANGE UP (ref 80–100)
PHOSPHATE SERPL-MCNC: 2.1 MG/DL — LOW (ref 2.5–4.5)
PLATELET # BLD AUTO: 188 K/UL — SIGNIFICANT CHANGE UP (ref 150–400)
POTASSIUM SERPL-MCNC: 4.3 MMOL/L — SIGNIFICANT CHANGE UP (ref 3.5–5.3)
POTASSIUM SERPL-SCNC: 4.3 MMOL/L — SIGNIFICANT CHANGE UP (ref 3.5–5.3)
RBC # BLD: 4.88 M/UL — SIGNIFICANT CHANGE UP (ref 4.2–5.8)
RBC # FLD: 11.6 % — SIGNIFICANT CHANGE UP (ref 10.3–14.5)
SODIUM SERPL-SCNC: 140 MMOL/L — SIGNIFICANT CHANGE UP (ref 135–145)
WBC # BLD: 11.8 K/UL — HIGH (ref 3.8–10.5)
WBC # FLD AUTO: 11.8 K/UL — HIGH (ref 3.8–10.5)

## 2018-08-22 PROCEDURE — 70553 MRI BRAIN STEM W/O & W/DYE: CPT | Mod: 26

## 2018-08-22 PROCEDURE — 99292 CRITICAL CARE ADDL 30 MIN: CPT

## 2018-08-22 PROCEDURE — 70450 CT HEAD/BRAIN W/O DYE: CPT | Mod: 26

## 2018-08-22 PROCEDURE — 99291 CRITICAL CARE FIRST HOUR: CPT

## 2018-08-22 RX ORDER — DEXAMETHASONE 0.5 MG/5ML
4 ELIXIR ORAL EVERY 6 HOURS
Qty: 0 | Refills: 0 | Status: DISCONTINUED | OUTPATIENT
Start: 2018-08-22 | End: 2018-08-24

## 2018-08-22 RX ORDER — DEXAMETHASONE 0.5 MG/5ML
4 ELIXIR ORAL ONCE
Qty: 0 | Refills: 0 | Status: DISCONTINUED | OUTPATIENT
Start: 2018-08-22 | End: 2018-08-22

## 2018-08-22 RX ORDER — ACETAMINOPHEN 500 MG
1000 TABLET ORAL ONCE
Qty: 0 | Refills: 0 | Status: COMPLETED | OUTPATIENT
Start: 2018-08-22 | End: 2018-08-22

## 2018-08-22 RX ORDER — DEXAMETHASONE 0.5 MG/5ML
4 ELIXIR ORAL ONCE
Qty: 0 | Refills: 0 | Status: COMPLETED | OUTPATIENT
Start: 2018-08-22 | End: 2018-08-22

## 2018-08-22 RX ADMIN — Medication 1000 MILLIGRAM(S): at 14:07

## 2018-08-22 RX ADMIN — Medication 400 MILLIGRAM(S): at 19:29

## 2018-08-22 RX ADMIN — Medication 10 MILLIGRAM(S): at 04:00

## 2018-08-22 RX ADMIN — Medication 400 MILLIGRAM(S): at 13:37

## 2018-08-22 RX ADMIN — Medication 4 MILLIGRAM(S): at 08:31

## 2018-08-22 RX ADMIN — Medication 100 MILLIGRAM(S): at 18:49

## 2018-08-22 RX ADMIN — Medication 1000 MILLIGRAM(S): at 19:44

## 2018-08-22 RX ADMIN — Medication 1000 MILLIGRAM(S): at 01:40

## 2018-08-22 RX ADMIN — Medication 1000 MILLIGRAM(S): at 07:47

## 2018-08-22 RX ADMIN — Medication 4 MILLIGRAM(S): at 18:49

## 2018-08-22 RX ADMIN — Medication 400 MILLIGRAM(S): at 01:10

## 2018-08-22 RX ADMIN — GRANISETRON HYDROCHLORIDE 1 MILLIGRAM(S): 1 TABLET, FILM COATED ORAL at 00:08

## 2018-08-22 RX ADMIN — ONDANSETRON 4 MILLIGRAM(S): 8 TABLET, FILM COATED ORAL at 02:10

## 2018-08-22 RX ADMIN — Medication 4 MILLIGRAM(S): at 12:49

## 2018-08-22 RX ADMIN — Medication 400 MILLIGRAM(S): at 07:33

## 2018-08-22 RX ADMIN — Medication 100 GRAM(S): at 00:09

## 2018-08-22 RX ADMIN — Medication 4 MILLIGRAM(S): at 23:33

## 2018-08-22 RX ADMIN — Medication 4 MILLIGRAM(S): at 02:18

## 2018-08-22 NOTE — PROGRESS NOTE ADULT - SUBJECTIVE AND OBJECTIVE BOX
HPI:  This is a 49 y/o male with PMH of DAVF in Right occipital region. Underwent Cerebral Angiogram/ Embolization. (14 Aug 2018 16:49)    On admission, the patient was:  GCS: 15.    Overnight: c/o intractable nausea.    EXAMINATION:  General:  calm, cooperative.      HEENT:  EOMI, PERRLA, face symmetric.  Neuro:  awake, alert, oriented x 3, follows commands, moves all extremities 5/5.  Cards:  regular S1S2.  Respiratory:  no respiratory distress,  CTAB.  Abdomen:  soft  Extremities:  no edema  Skin:  warm/dry

## 2018-08-22 NOTE — PROGRESS NOTE ADULT - SUBJECTIVE AND OBJECTIVE BOX
Visit Summary: Patient seen and evaluated at bedside. Patient s/p embolization of occipital dAVF today    Overnight Events: none    Exam:  T(C): 36.7 (08-21-18 @ 23:00), Max: 36.7 (08-21-18 @ 19:00)  HR: 87 (08-21-18 @ 23:00) (53 - 87)  BP: --  RR: 19 (08-21-18 @ 23:00) (10 - 20)  SpO2: 100% (08-21-18 @ 23:00) (96% - 100%)  Wt(kg): --    AOx3, FC, PERRL, EOMI, V1-3 intact, no facial, palate kathie symmetric, tongue midline, shrug 5/5  5/5 throughout, no drift  SILT  No clonus or babinski    Groin with no hematoma                        14.4   9.1   )-----------( 162      ( 21 Aug 2018 20:49 )             41.6     08-21    138  |  103  |  14  ----------------------------<  156<H>  4.4   |  19<L>  |  0.96    Ca    8.7      21 Aug 2018 20:49  Phos  3.9     08-21  Mg     1.8     08-21

## 2018-08-22 NOTE — PROGRESS NOTE ADULT - SUBJECTIVE AND OBJECTIVE BOX
Summary:  48 year-old man with history of hypotestosteronism and known dural AVM diagnosed in 2017 underwent angio/embo of right occipital dural AVM, including embolization of middle meningeal artery and occipital artery on 8/21/18.    24 Hour Events:  Intractable nausea. CT with small amount of IVH. Nausea and headache improved with steroids.     Vitals/Orders/Data: [x] Reviewed    Examination:  Awake, alert, fully oriented, PERRL, face symmetric, no drift, full strength, no groin hematoma, good distal pulses Summary:  48 year-old man with history of hypotestosteronism and known dural AVM diagnosed in 2017 underwent angio/embo of right occipital dural AVM, including embolization of middle meningeal artery and occipital artery on 8/21/18.    24 Hour Events:  Intractable nausea. CT with small amount of IVH. Nausea and headache improved with steroids.     Vitals/Orders/Data: [x] Reviewed    Examination:  Awake, alert, fully oriented, PERRL, face symmetric, no drift, fu ll strength, no groin hematoma, good distal pulses

## 2018-08-22 NOTE — PROGRESS NOTE ADULT - ASSESSMENT
Dural AVM, post-operative day 1 from angio/embo  - MRI/A  - Pain control  - -140mmHg  - Advance diet as tolerated  - IVF given recent contrast load and poor POs  - PRN antiemetics  - steroids for headache/nausea/cerebral edema form embolization material    45 minutes critical care time

## 2018-08-22 NOTE — PROGRESS NOTE ADULT - ASSESSMENT
ASSESSMENT:   Dural fistula s/p ryne embolization, POD 2.    NEURO:  Nausea and Pain control, avoid oversedation  CTh am, overnight CTh with small IVH (done in view of persistent HA and nausea)  Activity: [x] OOB as tolerated [] Bedrest [x] PT tomorrow    PULM:  IS    CV:  -150mmHg,    RENAL:  IVF while NPO    GI:  Diet: Dysphagia screen and then advance diet as tolerated  GI prophylaxis [x] not indicated [] PPI [] other:  Bowel regimen [x] colace [x] senna [] other:    ENDO:   Goal euglycemia (-180)    HEME/ONC:  VTE prophylaxis: [x] SCDs [x] chemoprophylaxis Lovenox tonight    ID:  Deanna-op antibiotics    SOCIAL/FAMILY:  [x] awaiting [] updated at bedside [] family meeting    CODE STATUS:  [x] Full Code [] DNR [] DNI [] Palliative/Comfort Care    DISPOSITION:  ICU    [x] Patient is at high risk of neurologic deterioration/death due to: post operative hemorrhage, stroke    Time spent: 45 critical care minutes

## 2018-08-23 DIAGNOSIS — A69.20 LYME DISEASE, UNSPECIFIED: ICD-10-CM

## 2018-08-23 DIAGNOSIS — Z29.9 ENCOUNTER FOR PROPHYLACTIC MEASURES, UNSPECIFIED: ICD-10-CM

## 2018-08-23 LAB
APTT BLD: 25.6 SEC — LOW (ref 27.5–37.4)
APTT BLD: 42.4 SEC — HIGH (ref 27.5–37.4)
INR BLD: 1.14 RATIO — SIGNIFICANT CHANGE UP (ref 0.88–1.16)
PROTHROM AB SERPL-ACNC: 12.4 SEC — SIGNIFICANT CHANGE UP (ref 9.8–12.7)

## 2018-08-23 PROCEDURE — 99223 1ST HOSP IP/OBS HIGH 75: CPT

## 2018-08-23 PROCEDURE — 99233 SBSQ HOSP IP/OBS HIGH 50: CPT

## 2018-08-23 RX ORDER — ACETAMINOPHEN 500 MG
1000 TABLET ORAL ONCE
Qty: 0 | Refills: 0 | Status: COMPLETED | OUTPATIENT
Start: 2018-08-23 | End: 2018-08-23

## 2018-08-23 RX ORDER — AZITHROMYCIN 500 MG/1
500 TABLET, FILM COATED ORAL ONCE
Qty: 0 | Refills: 0 | Status: COMPLETED | OUTPATIENT
Start: 2018-08-23 | End: 2018-08-23

## 2018-08-23 RX ORDER — HEPARIN SODIUM 5000 [USP'U]/ML
1000 INJECTION INTRAVENOUS; SUBCUTANEOUS
Qty: 25000 | Refills: 0 | Status: DISCONTINUED | OUTPATIENT
Start: 2018-08-23 | End: 2018-08-24

## 2018-08-23 RX ORDER — ENOXAPARIN SODIUM 100 MG/ML
40 INJECTION SUBCUTANEOUS
Qty: 0 | Refills: 0 | Status: DISCONTINUED | OUTPATIENT
Start: 2018-08-23 | End: 2018-08-23

## 2018-08-23 RX ORDER — LANOLIN ALCOHOL/MO/W.PET/CERES
3 CREAM (GRAM) TOPICAL AT BEDTIME
Qty: 0 | Refills: 0 | Status: DISCONTINUED | OUTPATIENT
Start: 2018-08-23 | End: 2018-08-27

## 2018-08-23 RX ORDER — CEFTRIAXONE 500 MG/1
2 INJECTION, POWDER, FOR SOLUTION INTRAMUSCULAR; INTRAVENOUS ONCE
Qty: 0 | Refills: 0 | Status: COMPLETED | OUTPATIENT
Start: 2018-08-23 | End: 2018-08-23

## 2018-08-23 RX ORDER — CEFTRIAXONE 500 MG/1
INJECTION, POWDER, FOR SOLUTION INTRAMUSCULAR; INTRAVENOUS
Qty: 0 | Refills: 0 | Status: DISCONTINUED | OUTPATIENT
Start: 2018-08-23 | End: 2018-08-26

## 2018-08-23 RX ORDER — CEFTRIAXONE 500 MG/1
2 INJECTION, POWDER, FOR SOLUTION INTRAMUSCULAR; INTRAVENOUS EVERY 24 HOURS
Qty: 0 | Refills: 0 | Status: DISCONTINUED | OUTPATIENT
Start: 2018-08-24 | End: 2018-08-26

## 2018-08-23 RX ORDER — AZITHROMYCIN 500 MG/1
500 TABLET, FILM COATED ORAL EVERY 24 HOURS
Qty: 0 | Refills: 0 | Status: DISCONTINUED | OUTPATIENT
Start: 2018-08-24 | End: 2018-08-26

## 2018-08-23 RX ORDER — AZITHROMYCIN 500 MG/1
TABLET, FILM COATED ORAL
Qty: 0 | Refills: 0 | Status: DISCONTINUED | OUTPATIENT
Start: 2018-08-23 | End: 2018-08-26

## 2018-08-23 RX ADMIN — Medication 1 TABLET(S): at 12:26

## 2018-08-23 RX ADMIN — AZITHROMYCIN 250 MILLIGRAM(S): 500 TABLET, FILM COATED ORAL at 12:17

## 2018-08-23 RX ADMIN — HEPARIN SODIUM 10 UNIT(S)/HR: 5000 INJECTION INTRAVENOUS; SUBCUTANEOUS at 19:55

## 2018-08-23 RX ADMIN — CEFTRIAXONE 100 GRAM(S): 500 INJECTION, POWDER, FOR SOLUTION INTRAMUSCULAR; INTRAVENOUS at 14:20

## 2018-08-23 RX ADMIN — Medication 400 MILLIGRAM(S): at 07:40

## 2018-08-23 RX ADMIN — HEPARIN SODIUM 10 UNIT(S)/HR: 5000 INJECTION INTRAVENOUS; SUBCUTANEOUS at 15:00

## 2018-08-23 RX ADMIN — Medication 4 MILLIGRAM(S): at 05:08

## 2018-08-23 RX ADMIN — Medication 1000 MILLIGRAM(S): at 07:55

## 2018-08-23 RX ADMIN — SENNA PLUS 2 TABLET(S): 8.6 TABLET ORAL at 23:59

## 2018-08-23 RX ADMIN — Medication 400 MILLIGRAM(S): at 01:40

## 2018-08-23 RX ADMIN — Medication 4 MILLIGRAM(S): at 12:26

## 2018-08-23 RX ADMIN — Medication 1000 MILLIGRAM(S): at 01:55

## 2018-08-23 RX ADMIN — Medication 4 MILLIGRAM(S): at 23:59

## 2018-08-23 RX ADMIN — Medication 62.5 MILLIMOLE(S): at 01:37

## 2018-08-23 RX ADMIN — Medication 4 MILLIGRAM(S): at 17:46

## 2018-08-23 NOTE — CONSULT NOTE ADULT - ATTENDING COMMENTS
[facility]  [phone] please call with any questions  Dr. M. Luke  [facility] Hospitalist  [phone]

## 2018-08-23 NOTE — PROGRESS NOTE ADULT - ASSESSMENT
48M POD2 from emblization of occipital dAVF  - neurochecks q1hr  - MRI / MRA w/ con, done f/u final read  - -140  - IVF

## 2018-08-23 NOTE — CONSULT NOTE ADULT - PROBLEM SELECTOR RECOMMENDATION 9
on prolonged course of treatment confirmed with outpatient ID office by neurosurgery   would continue treatment set as outpatient - patient should follow up once discharge for further management

## 2018-08-23 NOTE — CONSULT NOTE ADULT - SUBJECTIVE AND OBJECTIVE BOX
cc: visual disturbance    HPI:  48M h/o hypotestosteronism, lyme disease (being treated with IV abx for months), dural AVF (Dural Arteriovenous Fistula: Right occipital region) diagonosed 2017 presented for angiogram with embolization done on 8/21/18. post procedure developed nausea and vomiting which has since resolved. subsequent imaging revealed ?new hemorrhage on CT head. MRI brain with embolic material in the area and edema in R temporal lobe. patient does c/o blurry vision since procedure. does feel that floaters, frontal headache, and ringing in ears have improved.     lyme diagnosed ?in 2013 treated with oral abx without improvement of symptoms. later, found to have dural AV fistula as possible cause of symptoms but "in case" being treated by PMD and outpatient ID - Dr. Knight ([place]) for Lyme with IV ceftriaxone and IV azithromycin for total 4 months.     PAST MEDICAL & SURGICAL HISTORY:  Lyme disease: suspected. Treated 5/2018 to 8/2018 with I.V. Antibiotic via PIC Line  DAVF (dural arteriovenous fistula): dx: &#x27; 2017  Hypotestosteronism      Review of Systems:   CONSTITUTIONAL: No fever  EYES: No eye pain, +blurry vision, floaters  ENMT:  No difficulty hearing,   NECK: No pain or stiffness  RESPIRATORY: No cough, No shortness of breath  CARDIOVASCULAR: No chest pain, palpitations,  GASTROINTESTINAL: No abdominal or epigastric pain. No nausea, vomiting,  GENITOURINARY: No dysuria,   NEUROLOGICAL:+ headaches,   SKIN: No rashes  ENDOCRINE: No heat or cold intolerance  MUSCULOSKELETAL: No joint pain or swelling;   PSYCHIATRIC: No depression,   ALLERY AND IMMUNOLOGIC: No hives or eczema    Allergies    Avelox (Other)  Cipro (Other)  Levaquin (Other)    Social History: denies smoking or etoh abuse  works in [employer]    FAMILY HISTORY:  family history of cancers (mother and brother)    MEDICATIONS  (STANDING):  azithromycin  IVPB      cefTRIAXone   IVPB      dexamethasone  Injectable 4 milliGRAM(s) IV Push every 6 hours  docusate sodium 100 milliGRAM(s) Oral two times a day  heparin  Infusion 1000 Unit(s)/Hr (10 mL/Hr) IV Continuous <Continuous>  melatonin 3 milliGRAM(s) Oral at bedtime  multivitamin 1 Tablet(s) Oral daily  senna 2 Tablet(s) Oral at bedtime    MEDICATIONS  (PRN):  acetaminophen   Tablet 650 milliGRAM(s) Oral every 6 hours PRN For Temp greater than 38 C (100.4 F)  acetaminophen   Tablet. 650 milliGRAM(s) Oral every 6 hours PRN Mild Pain (1 - 3)  metoclopramide Injectable 10 milliGRAM(s) IV Push every 6 hours PRN nausea/vomiting  ondansetron Injectable 4 milliGRAM(s) IV Push every 6 hours PRN Nausea and/or Vomiting  oxyCODONE    IR 5 milliGRAM(s) Oral every 4 hours PRN Moderate Pain (4 - 6)  oxyCODONE    IR 10 milliGRAM(s) Oral every 4 hours PRN Severe Pain (7 - 10)      Vital Signs Last 24 Hrs  T(C): 36.7 (23 Aug 2018 10:00), Max: 36.8 (22 Aug 2018 19:00)  T(F): 98 (23 Aug 2018 10:00), Max: 98.3 (22 Aug 2018 23:00)  HR: 79 (23 Aug 2018 10:00) (53 - 83)  BP: 127/74 (23 Aug 2018 10:00) (115/67 - 127/74)  BP(mean): 91 (23 Aug 2018 07:00) (81 - 110)  RR: 18 (23 Aug 2018 10:00) (10 - 22)  SpO2: 97% (23 Aug 2018 10:00) (95% - 99%)  CAPILLARY BLOOD GLUCOSE        I&O's Summary    22 Aug 2018 07:01  -  23 Aug 2018 07:00  --------------------------------------------------------  IN: 2165 mL / OUT: 2345 mL / NET: -180 mL    23 Aug 2018 07:01  -  23 Aug 2018 15:44  --------------------------------------------------------  IN: 240 mL / OUT: 0 mL / NET: 240 mL        PHYSICAL EXAM:  GENERAL: NAD  HEAD:  Atraumatic, Normocephalic  EYES: conjunctiva and sclera clear  NECK: No JVD  CHEST/LUNG: CTA b/l  HEART: S1 S2 RRR  ABDOMEN: +BS Soft, NT/ND  EXTREMITIES:  2+ DP Pulses, No c/c/e  NEUROLOGY: AAOx3, no focal deficits     LABS:                        14.8   11.8  )-----------( 188      ( 22 Aug 2018 21:23 )             43.1     08-22    140  |  103  |  14  ----------------------------<  141<H>  4.3   |  23  |  1.05    Ca    9.8      22 Aug 2018 21:23  Phos  2.1     08-22  Mg     2.2     08-22      PT/INR - ( 23 Aug 2018 12:31 )   PT: 12.4 sec;   INR: 1.14 ratio         PTT - ( 23 Aug 2018 12:31 )  PTT:25.6 sec          RADIOLOGY & ADDITIONAL TESTS:    Imaging Personally Reviewed:    Consultant(s) Notes Reviewed: NSICU    Care Discussed with Consultants/Other Providers: d/w Neurosurgery CEM Cano regarding plan of care

## 2018-08-23 NOTE — PHYSICAL THERAPY INITIAL EVALUATION ADULT - ADDITIONAL COMMENTS
pt lives in an apartment with his 2 sons without steps to enter. prior to admission he was amb Independently without AD. he works from his home

## 2018-08-23 NOTE — CONSULT NOTE ADULT - PROBLEM SELECTOR RECOMMENDATION 2
s/p angiogram with embolization   with ?new hemorrhage and R temporal lobe edema   heparin gtt, steroid management per neurosurgery   plan for repeat MRI monday

## 2018-08-23 NOTE — PROGRESS NOTE ADULT - ASSESSMENT
ASSESSMENT:   Dural fistula s/p ryne embolization, POD 3.    NEURO:  Nausea and Pain control, avoid oversedation  MRI/MRA pending, small IVH on postop CT  Activity: [x] OOB as tolerated [] Bedrest [x] PT    PULM:  IS    CV:  -150mmHg,    RENAL:  IVF while impaired PO intake    GI:  Diet: advance diet as tolerated  GI prophylaxis [x] not indicated [] PPI [] other:  Bowel regimen [x] colace [x] senna [] other:    ENDO:   Goal euglycemia (-180)    HEME/ONC:  VTE prophylaxis: [x] SCDs [x] chemoprophylaxis Lovenox tonight    ID:  Deanna-op antibiotics    SOCIAL/FAMILY:  [x] awaiting [] updated at bedside [] family meeting    CODE STATUS:  [x] Full Code [] DNR [] DNI [] Palliative/Comfort Care    DISPOSITION:  possible transfer if stable MRI    [x] Patient was at high risk of neurologic deterioration/death due to: post operative hemorrhage, stroke    Time spent: 45 critical care minutes ASSESSMENT:   Dural fistula s/p ryne embolization, POD 3.    NEURO:  Nausea and Pain control, avoid oversedation  MRI/MRA done  Activity: [x] OOB as tolerated [] Bedrest [x] PT    PULM:  IS    CV:  -150mmHg,    RENAL:  IVF while impaired PO intake    GI:  Diet: advance diet as tolerated  GI prophylaxis [x] not indicated [] PPI [] other:  Bowel regimen [x] colace [x] senna [] other:    ENDO:   Goal euglycemia (-180)    HEME/ONC:  VTE prophylaxis: [x] SCDs [x] chemoprophylaxis Lovenox tonight    ID:  Deanna-op antibiotics    SOCIAL/FAMILY:  [x] awaiting [] updated at bedside [] family meeting    CODE STATUS:  [x] Full Code [] DNR [] DNI [] Palliative/Comfort Care    DISPOSITION:  Floor

## 2018-08-23 NOTE — PROGRESS NOTE ADULT - SUBJECTIVE AND OBJECTIVE BOX
HPI:  49 y/o male with PMH of DAVF in Right occipital region. Underwent Cerebral Angiogram/ Embolization. (14 Aug 2018 16:49)    On admission, the patient was:  GCS: 15.    Overnight: none reported.    EXAMINATION:  General:  calm, cooperative.      HEENT:  EOMI, PERRLA, face symmetric.  Neuro:  awake, alert, oriented x 3, follows commands, moves all extremities 5/5.  Cards:  regular S1S2.  Respiratory:  no respiratory distress,  CTAB.  Abdomen:  soft  Extremities:  no edema  Skin:  warm/dry

## 2018-08-23 NOTE — PROGRESS NOTE ADULT - SUBJECTIVE AND OBJECTIVE BOX
Visit Summary: Patient seen and evaluated at bedside.    Overnight Events: none    Exam:  T(C): 36.8 (08-22-18 @ 19:00), Max: 37.1 (08-22-18 @ 15:00)  HR: 63 (08-22-18 @ 19:00) (46 - 111)  BP: 115/67 (08-22-18 @ 19:00) (102/46 - 133/66)  RR: 18 (08-22-18 @ 19:00) (0 - 30)  SpO2: 98% (08-22-18 @ 19:00) (95% - 100%)  Wt(kg): --    AOx3, FC, PERRL, EOMI, V1-3 intact, no facial, palate kathie symmetric, tongue midline, shrug 5/5  5/5 throughout, no drift  SILT  No clonus or babinski                          14.8   11.8  )-----------( 188      ( 22 Aug 2018 21:23 )             43.1     08-22    140  |  103  |  14  ----------------------------<  141<H>  4.3   |  23  |  1.05    Ca    9.8      22 Aug 2018 21:23  Phos  2.1     08-22  Mg     2.2     08-22

## 2018-08-23 NOTE — CONSULT NOTE ADULT - ASSESSMENT
48M h/o hypotestosteronism, lyme disease (being treated with IV abx for months), dural AVF (Dural Arteriovenous Fistula: Right occipital region) diagonosed 2017 presented for angiogram with embolization done on 8/21/18. found to have ?new hemorrhage in area of embolization and edema in R temporal lobe post procedure.

## 2018-08-23 NOTE — CHART NOTE - NSCHARTNOTEFT_GEN_A_CORE
Per my conversation with Dre at Dr Stew Knight's office, [place], [place],  phone [phone], a renewal for IV antibiotics was recently sent to [facility]. I spoke with Nichelle at [facility] [phone]. Mr Solano's IV antibiotics were renewed until 9/10/18 as follows:  Azithromycin 500 mg IV  q 24 hrs, and Ceftriaxone 2 gm IV q 24 hrs.

## 2018-08-23 NOTE — PHYSICAL THERAPY INITIAL EVALUATION ADULT - PERTINENT HX OF CURRENT PROBLEM, REHAB EVAL
48 year-old man with history of hypotestosteronism and known dural AVM diagnosed in 2017 underwent angio/embo of right occipital dural AVM, including embolization of middle meningeal artery and occipital artery on 8/21/18.

## 2018-08-24 LAB
ANION GAP SERPL CALC-SCNC: 14 MMOL/L — SIGNIFICANT CHANGE UP (ref 5–17)
APTT BLD: 40.7 SEC — HIGH (ref 27.5–37.4)
APTT BLD: 40.8 SEC — HIGH (ref 27.5–37.4)
APTT BLD: 54.4 SEC — HIGH (ref 27.5–37.4)
APTT BLD: 59.2 SEC — HIGH (ref 27.5–37.4)
BUN SERPL-MCNC: 20 MG/DL — SIGNIFICANT CHANGE UP (ref 7–23)
CALCIUM SERPL-MCNC: 9.9 MG/DL — SIGNIFICANT CHANGE UP (ref 8.4–10.5)
CHLORIDE SERPL-SCNC: 100 MMOL/L — SIGNIFICANT CHANGE UP (ref 96–108)
CO2 SERPL-SCNC: 26 MMOL/L — SIGNIFICANT CHANGE UP (ref 22–31)
CREAT SERPL-MCNC: 1.01 MG/DL — SIGNIFICANT CHANGE UP (ref 0.5–1.3)
GLUCOSE SERPL-MCNC: 121 MG/DL — HIGH (ref 70–99)
HCT VFR BLD CALC: 43.2 % — SIGNIFICANT CHANGE UP (ref 39–50)
HCT VFR BLD CALC: 48.5 % — SIGNIFICANT CHANGE UP (ref 39–50)
HGB BLD-MCNC: 14.3 G/DL — SIGNIFICANT CHANGE UP (ref 13–17)
HGB BLD-MCNC: 15.7 G/DL — SIGNIFICANT CHANGE UP (ref 13–17)
MCHC RBC-ENTMCNC: 28.9 PG — SIGNIFICANT CHANGE UP (ref 27–34)
MCHC RBC-ENTMCNC: 29.4 PG — SIGNIFICANT CHANGE UP (ref 27–34)
MCHC RBC-ENTMCNC: 32.5 GM/DL — SIGNIFICANT CHANGE UP (ref 32–36)
MCHC RBC-ENTMCNC: 33.1 GM/DL — SIGNIFICANT CHANGE UP (ref 32–36)
MCV RBC AUTO: 88.8 FL — SIGNIFICANT CHANGE UP (ref 80–100)
MCV RBC AUTO: 88.8 FL — SIGNIFICANT CHANGE UP (ref 80–100)
PLATELET # BLD AUTO: 204 K/UL — SIGNIFICANT CHANGE UP (ref 150–400)
PLATELET # BLD AUTO: 257 K/UL — SIGNIFICANT CHANGE UP (ref 150–400)
POTASSIUM SERPL-MCNC: 3.9 MMOL/L — SIGNIFICANT CHANGE UP (ref 3.5–5.3)
POTASSIUM SERPL-SCNC: 3.9 MMOL/L — SIGNIFICANT CHANGE UP (ref 3.5–5.3)
RBC # BLD: 4.87 M/UL — SIGNIFICANT CHANGE UP (ref 4.2–5.8)
RBC # BLD: 5.46 M/UL — SIGNIFICANT CHANGE UP (ref 4.2–5.8)
RBC # FLD: 11.3 % — SIGNIFICANT CHANGE UP (ref 10.3–14.5)
RBC # FLD: 11.8 % — SIGNIFICANT CHANGE UP (ref 10.3–14.5)
SODIUM SERPL-SCNC: 140 MMOL/L — SIGNIFICANT CHANGE UP (ref 135–145)
WBC # BLD: 12.9 K/UL — HIGH (ref 3.8–10.5)
WBC # BLD: 9.9 K/UL — SIGNIFICANT CHANGE UP (ref 3.8–10.5)
WBC # FLD AUTO: 12.9 K/UL — HIGH (ref 3.8–10.5)
WBC # FLD AUTO: 9.9 K/UL — SIGNIFICANT CHANGE UP (ref 3.8–10.5)

## 2018-08-24 PROCEDURE — 99232 SBSQ HOSP IP/OBS MODERATE 35: CPT

## 2018-08-24 RX ORDER — HEPARIN SODIUM 5000 [USP'U]/ML
1000 INJECTION INTRAVENOUS; SUBCUTANEOUS
Qty: 25000 | Refills: 0 | Status: DISCONTINUED | OUTPATIENT
Start: 2018-08-24 | End: 2018-08-24

## 2018-08-24 RX ORDER — HEPARIN SODIUM 5000 [USP'U]/ML
1100 INJECTION INTRAVENOUS; SUBCUTANEOUS
Qty: 25000 | Refills: 0 | Status: DISCONTINUED | OUTPATIENT
Start: 2018-08-24 | End: 2018-08-24

## 2018-08-24 RX ORDER — HEPARIN SODIUM 5000 [USP'U]/ML
1100 INJECTION INTRAVENOUS; SUBCUTANEOUS
Qty: 25000 | Refills: 0 | Status: DISCONTINUED | OUTPATIENT
Start: 2018-08-24 | End: 2018-08-25

## 2018-08-24 RX ADMIN — Medication 1 TABLET(S): at 11:56

## 2018-08-24 RX ADMIN — Medication 3 MILLIGRAM(S): at 00:01

## 2018-08-24 RX ADMIN — SCOPALAMINE 1 PATCH: 1 PATCH, EXTENDED RELEASE TRANSDERMAL at 21:18

## 2018-08-24 RX ADMIN — HEPARIN SODIUM 11 UNIT(S)/HR: 5000 INJECTION INTRAVENOUS; SUBCUTANEOUS at 10:45

## 2018-08-24 RX ADMIN — Medication 650 MILLIGRAM(S): at 00:00

## 2018-08-24 RX ADMIN — HEPARIN SODIUM 10 UNIT(S)/HR: 5000 INJECTION INTRAVENOUS; SUBCUTANEOUS at 02:05

## 2018-08-24 RX ADMIN — Medication 3 MILLIGRAM(S): at 21:19

## 2018-08-24 RX ADMIN — Medication 100 MILLIGRAM(S): at 21:19

## 2018-08-24 RX ADMIN — Medication 650 MILLIGRAM(S): at 21:20

## 2018-08-24 RX ADMIN — SENNA PLUS 2 TABLET(S): 8.6 TABLET ORAL at 21:19

## 2018-08-24 RX ADMIN — CEFTRIAXONE 100 GRAM(S): 500 INJECTION, POWDER, FOR SOLUTION INTRAMUSCULAR; INTRAVENOUS at 11:11

## 2018-08-24 RX ADMIN — Medication 650 MILLIGRAM(S): at 22:17

## 2018-08-24 RX ADMIN — AZITHROMYCIN 250 MILLIGRAM(S): 500 TABLET, FILM COATED ORAL at 11:53

## 2018-08-24 RX ADMIN — HEPARIN SODIUM 10 UNIT(S)/HR: 5000 INJECTION INTRAVENOUS; SUBCUTANEOUS at 03:04

## 2018-08-24 RX ADMIN — Medication 4 MILLIGRAM(S): at 08:17

## 2018-08-24 RX ADMIN — Medication 100 MILLIGRAM(S): at 08:21

## 2018-08-24 RX ADMIN — Medication 650 MILLIGRAM(S): at 00:30

## 2018-08-24 NOTE — PROGRESS NOTE ADULT - SUBJECTIVE AND OBJECTIVE BOX
SUBJECTIVE: Pt seen and examined,     OVERNIGHT EVENTS:     Vital Signs Last 24 Hrs  T(C): 36.6 (24 Aug 2018 15:34), Max: 36.9 (24 Aug 2018 12:06)  T(F): 97.9 (24 Aug 2018 15:34), Max: 98.4 (24 Aug 2018 12:06)  HR: 88 (24 Aug 2018 15:34) (70 - 106)  BP: 117/79 (24 Aug 2018 15:34) (107/67 - 131/86)  BP(mean): --  RR: 18 (24 Aug 2018 15:34) (18 - 18)  SpO2: 95% (24 Aug 2018 15:34) (95% - 99%)    PHYSICAL EXAM:    General: No Acute Distress     Neurological: Awake, alert oriented to person, place and time, Following Commands, PERRL, EOMI, Face Symmetrical, Speech Fluent, Moving all extremities, Muscle Strength normal in all four extremities, No Drift, Sensation to Light Touch Intact    Pulmonary: Clear to Auscultation, No Rales, No Rhonchi, No Wheezes     Cardiovascular: S1, S2, Regular Rate and Rhythm     Gastrointestinal: Soft, Nontender, Nondistended     Incision:     LABS:                        15.7   12.9  )-----------( 257      ( 24 Aug 2018 07:50 )             48.5    08-24    140  |  100  |  20  ----------------------------<  121<H>  3.9   |  26  |  1.01    Ca    9.9      24 Aug 2018 07:50  Phos  2.1     08-22  Mg     2.2     08-22    PT/INR - ( 23 Aug 2018 12:31 )   PT: 12.4 sec;   INR: 1.14 ratio         PTT - ( 24 Aug 2018 07:50 )  PTT:40.7 sec      08-23 @ 07:01  -  08-24 @ 07:00  --------------------------------------------------------  IN: 480 mL / OUT: 0 mL / NET: 480 mL    08-24 @ 07:01  -  08-24 @ 16:07  --------------------------------------------------------  IN: 560 mL / OUT: 0 mL / NET: 560 mL      DRAINS:     MEDICATIONS:  Antibiotics:  azithromycin  IVPB      azithromycin  IVPB 500 milliGRAM(s) IV Intermittent every 24 hours  cefTRIAXone   IVPB      cefTRIAXone   IVPB 2 Gram(s) IV Intermittent every 24 hours    Neuro:  acetaminophen   Tablet 650 milliGRAM(s) Oral every 6 hours PRN For Temp greater than 38 C (100.4 F)  acetaminophen   Tablet. 650 milliGRAM(s) Oral every 6 hours PRN Mild Pain (1 - 3)  melatonin 3 milliGRAM(s) Oral at bedtime  metoclopramide Injectable 10 milliGRAM(s) IV Push every 6 hours PRN nausea/vomiting  ondansetron Injectable 4 milliGRAM(s) IV Push every 6 hours PRN Nausea and/or Vomiting  oxyCODONE    IR 5 milliGRAM(s) Oral every 4 hours PRN Moderate Pain (4 - 6)  oxyCODONE    IR 10 milliGRAM(s) Oral every 4 hours PRN Severe Pain (7 - 10)    Cardiac:    Pulm:    GI/:  docusate sodium 100 milliGRAM(s) Oral two times a day  senna 2 Tablet(s) Oral at bedtime    Other:   heparin  Infusion 1100 Unit(s)/Hr IV Continuous <Continuous>  multivitamin 1 Tablet(s) Oral daily    DIET: [] Regular [] CCD [] Renal [] Puree [] Dysphagia [] Tube Feeds:     IMAGING: SUBJECTIVE: Pt seen and examined, pt reports visual disturbances, difficulty reading at times, no floaters or flashes    OVERNIGHT EVENTS: none    Vital Signs Last 24 Hrs  T(C): 36.6 (24 Aug 2018 15:34), Max: 36.9 (24 Aug 2018 12:06)  T(F): 97.9 (24 Aug 2018 15:34), Max: 98.4 (24 Aug 2018 12:06)  HR: 88 (24 Aug 2018 15:34) (70 - 106)  BP: 117/79 (24 Aug 2018 15:34) (107/67 - 131/86)  BP(mean): --  RR: 18 (24 Aug 2018 15:34) (18 - 18)  SpO2: 95% (24 Aug 2018 15:34) (95% - 99%)    PHYSICAL EXAM:    General: No Acute Distress     Neurological: Awake, alert oriented to person, place and time, Following Commands, PERRL, EOMI, Face Symmetrical, Speech Fluent, Moving all extremities, Muscle Strength normal in all four extremities, No Drift, Sensation to Light Touch Intact    Pulmonary: Clear to Auscultation, No Rales, No Rhonchi, No Wheezes     Cardiovascular: S1, S2, Regular Rate and Rhythm     Gastrointestinal: Soft, Nontender, Nondistended     Incision: none    LABS:                        15.7   12.9  )-----------( 257      ( 24 Aug 2018 07:50 )             48.5    08-24    140  |  100  |  20  ----------------------------<  121<H>  3.9   |  26  |  1.01    Ca    9.9      24 Aug 2018 07:50  Phos  2.1     08-22  Mg     2.2     08-22    PT/INR - ( 23 Aug 2018 12:31 )   PT: 12.4 sec;   INR: 1.14 ratio         PTT - ( 24 Aug 2018 07:50 )  PTT:40.7 sec      08-23 @ 07:01  -  08-24 @ 07:00  --------------------------------------------------------  IN: 480 mL / OUT: 0 mL / NET: 480 mL    08-24 @ 07:01 - 08-24 @ 16:07  --------------------------------------------------------  IN: 560 mL / OUT: 0 mL / NET: 560 mL      DRAINS: none    MEDICATIONS:  Antibiotics:  azithromycin  IVPB      azithromycin  IVPB 500 milliGRAM(s) IV Intermittent every 24 hours  cefTRIAXone   IVPB      cefTRIAXone   IVPB 2 Gram(s) IV Intermittent every 24 hours    Neuro:  acetaminophen   Tablet 650 milliGRAM(s) Oral every 6 hours PRN For Temp greater than 38 C (100.4 F)  acetaminophen   Tablet. 650 milliGRAM(s) Oral every 6 hours PRN Mild Pain (1 - 3)  melatonin 3 milliGRAM(s) Oral at bedtime  metoclopramide Injectable 10 milliGRAM(s) IV Push every 6 hours PRN nausea/vomiting  ondansetron Injectable 4 milliGRAM(s) IV Push every 6 hours PRN Nausea and/or Vomiting  oxyCODONE    IR 5 milliGRAM(s) Oral every 4 hours PRN Moderate Pain (4 - 6)  oxyCODONE    IR 10 milliGRAM(s) Oral every 4 hours PRN Severe Pain (7 - 10)    Cardiac:    Pulm:    GI/:  docusate sodium 100 milliGRAM(s) Oral two times a day  senna 2 Tablet(s) Oral at bedtime    Other:   heparin  Infusion 1100 Unit(s)/Hr IV Continuous <Continuous>  multivitamin 1 Tablet(s) Oral daily    DIET: [x] Regular [] CCD [] Renal [] Puree [] Dysphagia [] Tube Feeds:     IMAGING:   < from: MR Angio Head w/wo IV Cont (08.22.18 @ 18:11) >  MRI BRAIN:    Serpiginous susceptibility artifact in the right temporal occipital   region is consistent with the presence of embolic material seen on brain   CT from 8/22/2018. There is new edema in the right temporal occipital   region.    No hydrocephalus or midline shift. Signal voids are seen within the major   intracranial vessels consistent with their patency.    Scattered increased signal is seen in bilateral sulci, consistent with   new subarachnoid hemorrhage.    Redemonstration of prominent right middle meningeal artery and distal   right PCA branches. Significantly decreased enhancement of previously   seen prominent draining vein extending to the straight sinus. Previously   seen enhancing nidus in the right temporal occipital region is no longer   visualized.    The visualized paranasal sinuses and mastoid air cells are clear. The   orbits, sellar and suprasellar structures, and craniocervical junction   are unremarkable.    MRA BRAIN:    New serpiginous susceptibility artifact in the right temporal occipital   region, consistent with recent embolizationof dural AV fistula.   Previously seen enhancing right temporal occipital nidus is no longer   visualized. Previously seen enlarged draining vein extending to the   straight sinus is no longer visualized. Previously seen prominent scalp   veins overlying region of the AV dural fistula are no longer visualized.    Normal flow-related enhancement of the bilateral anterior, middle, and   posterior cerebral arteries, the basilar artery, the intracranial   vertebral arteries, and the skull base/intracranial internal carotid   arteries.    Prominent right middle meningeal artery and distal right PCA branches are   again seen.    No vascular aneurysm.    IMPRESSION:    MRI BRAIN:    Serpiginous susceptibility artifact in the right temporal occipital  region is consistent with the presence of embolic material seen on brain   CT from 8/22/2018. New edema in the right temporal occipital region.    Significantly decreased enhancement of previously seen prominent draining   vein extending to the straight sinus. Previously seen enhancing nidus in   the right temporal occipital region is no longer visualized.    MRA BRAIN:    Previously seen enhancing right temporal occipital nidus is no longer   visualized. Previously seen enlarged draining vein extending to the   straight sinus is no longer visualized. Previously seen prominent scalp   veins overlying region of the AV dural fistula are no longer visualized.    < end of copied text >

## 2018-08-24 NOTE — OCCUPATIONAL THERAPY INITIAL EVALUATION ADULT - ADDITIONAL COMMENTS
MRI BRAIN: 8/22 Serpiginous susceptibility artifact in the right temporal occipital region is consistent with the presence of embolic material seen on brain CT from 8/22/2018. New edema in the right temporal occipital region. Significantly decreased enhancement of previously seen prominent draining vein extending to the straight sinus. Previously seen enhancing nidus in the right temporal occipital region is no longer visualized.  MRA BRAIN: Previously seen enhancing right temporal occipital nidus is no longer visualized. Previously seen enlarged draining vein extending to the straight sinus is no longer visualized. Previously seen prominent scalp veins overlying region of the AV dural fistula are no longer visualized.

## 2018-08-24 NOTE — PROGRESS NOTE ADULT - ASSESSMENT
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 Now scheduled: Cerebral Angiogram/ Embolization.     PROCEDURE:  8/21 s/p angiogram/ ryne embolization of dural fistula  POD#3    PLAN:  Neuro:   - continue heparin gtt for venous congestion  - PTT goal- 50-60  - oxycodone prn for pain  - pt ambulating freely on floor  CV:  - vital signs stable  DVT ppx:   - venodynes while in bed  ID:   - treatment of Lyme- continue azithromycin 500mg IV daily and ceftriaxone 2 gm IV daily until 9/10/18 per private ID Dr Stew Knight  - via PICC line  PT/OT:   - no PT needs  -OT- outpatient vision therapy      [facility] # [id number]

## 2018-08-24 NOTE — OCCUPATIONAL THERAPY INITIAL EVALUATION ADULT - COGNITIVE, VISUAL PERCEPTUAL, OT EVAL
Patient to utilize visual compensatory techniques to attend/turn head to L side to safely navigate obstacles

## 2018-08-24 NOTE — PROVIDER CONTACT NOTE (OTHER) - ASSESSMENT
Pt A&OX4, VSS, Denies cp, sob, and distress,  Pt resting safely in bed, Hep gtt in place at 10ml/hr,

## 2018-08-25 LAB
APTT BLD: 55.5 SEC — HIGH (ref 27.5–37.4)
APTT BLD: 57.8 SEC — HIGH (ref 27.5–37.4)
APTT BLD: 63.9 SEC — HIGH (ref 27.5–37.4)
HCT VFR BLD CALC: 42.6 % — SIGNIFICANT CHANGE UP (ref 39–50)
HGB BLD-MCNC: 13.9 G/DL — SIGNIFICANT CHANGE UP (ref 13–17)
MCHC RBC-ENTMCNC: 28.7 PG — SIGNIFICANT CHANGE UP (ref 27–34)
MCHC RBC-ENTMCNC: 32.5 GM/DL — SIGNIFICANT CHANGE UP (ref 32–36)
MCV RBC AUTO: 88.3 FL — SIGNIFICANT CHANGE UP (ref 80–100)
PLATELET # BLD AUTO: 188 K/UL — SIGNIFICANT CHANGE UP (ref 150–400)
RBC # BLD: 4.82 M/UL — SIGNIFICANT CHANGE UP (ref 4.2–5.8)
RBC # FLD: 11.1 % — SIGNIFICANT CHANGE UP (ref 10.3–14.5)
WBC # BLD: 6.7 K/UL — SIGNIFICANT CHANGE UP (ref 3.8–10.5)
WBC # FLD AUTO: 6.7 K/UL — SIGNIFICANT CHANGE UP (ref 3.8–10.5)

## 2018-08-25 PROCEDURE — 99232 SBSQ HOSP IP/OBS MODERATE 35: CPT

## 2018-08-25 PROCEDURE — 70553 MRI BRAIN STEM W/O & W/DYE: CPT | Mod: 26

## 2018-08-25 RX ORDER — ACETAMINOPHEN 500 MG
1000 TABLET ORAL ONCE
Qty: 0 | Refills: 0 | Status: COMPLETED | OUTPATIENT
Start: 2018-08-25 | End: 2018-08-25

## 2018-08-25 RX ORDER — HEPARIN SODIUM 5000 [USP'U]/ML
1050 INJECTION INTRAVENOUS; SUBCUTANEOUS
Qty: 25000 | Refills: 0 | Status: DISCONTINUED | OUTPATIENT
Start: 2018-08-25 | End: 2018-08-26

## 2018-08-25 RX ORDER — ALPRAZOLAM 0.25 MG
0.25 TABLET ORAL ONCE
Qty: 0 | Refills: 0 | Status: DISCONTINUED | OUTPATIENT
Start: 2018-08-25 | End: 2018-08-25

## 2018-08-25 RX ADMIN — Medication 3 MILLIGRAM(S): at 22:07

## 2018-08-25 RX ADMIN — SENNA PLUS 2 TABLET(S): 8.6 TABLET ORAL at 22:01

## 2018-08-25 RX ADMIN — Medication 100 MILLIGRAM(S): at 05:37

## 2018-08-25 RX ADMIN — Medication 1000 MILLIGRAM(S): at 22:31

## 2018-08-25 RX ADMIN — AZITHROMYCIN 250 MILLIGRAM(S): 500 TABLET, FILM COATED ORAL at 11:50

## 2018-08-25 RX ADMIN — Medication 400 MILLIGRAM(S): at 22:01

## 2018-08-25 RX ADMIN — HEPARIN SODIUM 10.5 UNIT(S)/HR: 5000 INJECTION INTRAVENOUS; SUBCUTANEOUS at 11:16

## 2018-08-25 RX ADMIN — Medication 650 MILLIGRAM(S): at 18:19

## 2018-08-25 RX ADMIN — Medication 100 MILLIGRAM(S): at 17:33

## 2018-08-25 RX ADMIN — Medication 1 TABLET(S): at 11:19

## 2018-08-25 RX ADMIN — Medication 650 MILLIGRAM(S): at 17:34

## 2018-08-25 RX ADMIN — Medication 650 MILLIGRAM(S): at 08:25

## 2018-08-25 RX ADMIN — CEFTRIAXONE 100 GRAM(S): 500 INJECTION, POWDER, FOR SOLUTION INTRAMUSCULAR; INTRAVENOUS at 11:15

## 2018-08-25 RX ADMIN — Medication 650 MILLIGRAM(S): at 07:26

## 2018-08-25 NOTE — PROGRESS NOTE ADULT - SUBJECTIVE AND OBJECTIVE BOX
SUBJECTIVE: Pt seen and examined, pt is anxious, awaiting follow up MRI today, currently on heparin gtt, pt reports numbness in back of head, mild headaches, and dizziness today, spoke to pt's friend at bedside per pt request.     OVERNIGHT EVENTS: none    Vital Signs Last 24 Hrs  T(C): 36.3 (25 Aug 2018 13:56), Max: 37 (24 Aug 2018 20:09)  T(F): 97.3 (25 Aug 2018 13:56), Max: 98.6 (24 Aug 2018 20:09)  HR: 70 (25 Aug 2018 13:56) (61 - 88)  BP: 122/79 (25 Aug 2018 13:56) (116/76 - 122/79)  BP(mean): --  RR: 18 (25 Aug 2018 13:56) (18 - 18)  SpO2: 98% (25 Aug 2018 13:56) (95% - 98%)    PHYSICAL EXAM:    General: No Acute Distress     Neurological: Awake, alert oriented to person, place and time, Following Commands, PERRL, EOMI, Face Symmetrical, Speech Fluent, Moving all extremities, Muscle Strength normal in all four extremities, No Drift, Sensation to Light Touch Intact    Pulmonary: Clear to Auscultation, No Rales, No Rhonchi, No Wheezes     Cardiovascular: S1, S2, Regular Rate and Rhythm     Gastrointestinal: Soft, Nontender, Nondistended     Incision: none    LABS:                        13.9   6.7   )-----------( 188      ( 25 Aug 2018 06:53 )             42.6    08-24    140  |  100  |  20  ----------------------------<  121<H>  3.9   |  26  |  1.01    Ca    9.9      24 Aug 2018 07:50    PTT - ( 25 Aug 2018 06:53 )  PTT:63.9 sec      08-24 @ 07:01  -  08-25 @ 07:00  --------------------------------------------------------  IN: 560 mL / OUT: 0 mL / NET: 560 mL      DRAINS: none    MEDICATIONS:  Antibiotics:  azithromycin  IVPB      azithromycin  IVPB 500 milliGRAM(s) IV Intermittent every 24 hours  cefTRIAXone   IVPB      cefTRIAXone   IVPB 2 Gram(s) IV Intermittent every 24 hours    Neuro:  acetaminophen   Tablet 650 milliGRAM(s) Oral every 6 hours PRN For Temp greater than 38 C (100.4 F)  acetaminophen   Tablet. 650 milliGRAM(s) Oral every 6 hours PRN Mild Pain (1 - 3)  melatonin 3 milliGRAM(s) Oral at bedtime  metoclopramide Injectable 10 milliGRAM(s) IV Push every 6 hours PRN nausea/vomiting  ondansetron Injectable 4 milliGRAM(s) IV Push every 6 hours PRN Nausea and/or Vomiting  oxyCODONE    IR 5 milliGRAM(s) Oral every 4 hours PRN Moderate Pain (4 - 6)  oxyCODONE    IR 10 milliGRAM(s) Oral every 4 hours PRN Severe Pain (7 - 10)    Cardiac:    Pulm:    GI/:  docusate sodium 100 milliGRAM(s) Oral two times a day  senna 2 Tablet(s) Oral at bedtime    Other:   heparin  Infusion 1050 Unit(s)/Hr IV Continuous <Continuous>  multivitamin 1 Tablet(s) Oral daily    DIET: [x] Regular [] CCD [] Renal [] Puree [] Dysphagia [] Tube Feeds:     IMAGING:

## 2018-08-25 NOTE — PROGRESS NOTE ADULT - ASSESSMENT
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 Now scheduled: Cerebral Angiogram/ Embolization.     PROCEDURE:  8/21 s/p angiogram/ ryne embolization of dural fistula  POD#4    PLAN:  Neuro:   - MRI today- f/u report  - continue heparin gtt for venous congestion  - PTT goal- 50-60  - oxycodone prn for pain  - pt ambulating freely on floor  CV:  - vital signs stable  DVT ppx:   - venodynes while in bed  ID:   - treatment of Lyme- continue azithromycin 500mg IV daily and ceftriaxone 2 gm IV daily until 9/10/18 per private ID Dr Stew Knight  - via PICC line  PT/OT:   - no PT needs  -OT- outpatient vision therapy      [facility] # [id number]

## 2018-08-26 LAB
APTT BLD: 35.4 SEC — SIGNIFICANT CHANGE UP (ref 27.5–37.4)
HCT VFR BLD CALC: 45.3 % — SIGNIFICANT CHANGE UP (ref 39–50)
HGB BLD-MCNC: 15.9 G/DL — SIGNIFICANT CHANGE UP (ref 13–17)
MCHC RBC-ENTMCNC: 29.9 PG — SIGNIFICANT CHANGE UP (ref 27–34)
MCHC RBC-ENTMCNC: 35.1 GM/DL — SIGNIFICANT CHANGE UP (ref 32–36)
MCV RBC AUTO: 85.3 FL — SIGNIFICANT CHANGE UP (ref 80–100)
PLATELET # BLD AUTO: 197 K/UL — SIGNIFICANT CHANGE UP (ref 150–400)
RBC # BLD: 5.31 M/UL — SIGNIFICANT CHANGE UP (ref 4.2–5.8)
RBC # FLD: 12.5 % — SIGNIFICANT CHANGE UP (ref 10.3–14.5)
WBC # BLD: 7.33 K/UL — SIGNIFICANT CHANGE UP (ref 3.8–10.5)
WBC # FLD AUTO: 7.33 K/UL — SIGNIFICANT CHANGE UP (ref 3.8–10.5)

## 2018-08-26 PROCEDURE — 99233 SBSQ HOSP IP/OBS HIGH 50: CPT

## 2018-08-26 RX ORDER — POLYETHYLENE GLYCOL 3350 17 G/17G
17 POWDER, FOR SOLUTION ORAL DAILY
Qty: 0 | Refills: 0 | Status: DISCONTINUED | OUTPATIENT
Start: 2018-08-26 | End: 2018-08-27

## 2018-08-26 RX ORDER — ACETAMINOPHEN 500 MG
2 TABLET ORAL
Qty: 0 | Refills: 0 | DISCHARGE
Start: 2018-08-26

## 2018-08-26 RX ORDER — HEPARIN SODIUM 5000 [USP'U]/ML
5000 INJECTION INTRAVENOUS; SUBCUTANEOUS EVERY 8 HOURS
Qty: 0 | Refills: 0 | Status: DISCONTINUED | OUTPATIENT
Start: 2018-08-26 | End: 2018-08-27

## 2018-08-26 RX ORDER — LANOLIN ALCOHOL/MO/W.PET/CERES
1 CREAM (GRAM) TOPICAL
Qty: 30 | Refills: 0
Start: 2018-08-26

## 2018-08-26 RX ORDER — ALPRAZOLAM 0.25 MG
1 TABLET ORAL
Qty: 30 | Refills: 0
Start: 2018-08-26

## 2018-08-26 RX ORDER — ALPRAZOLAM 0.25 MG
0.25 TABLET ORAL EVERY 8 HOURS
Qty: 0 | Refills: 0 | Status: DISCONTINUED | OUTPATIENT
Start: 2018-08-26 | End: 2018-08-27

## 2018-08-26 RX ORDER — DOCUSATE SODIUM 100 MG
1 CAPSULE ORAL
Qty: 0 | Refills: 0 | DISCHARGE
Start: 2018-08-26

## 2018-08-26 RX ORDER — ACETAMINOPHEN 500 MG
1000 TABLET ORAL ONCE
Qty: 0 | Refills: 0 | Status: DISCONTINUED | OUTPATIENT
Start: 2018-08-26 | End: 2018-08-27

## 2018-08-26 RX ORDER — SENNA PLUS 8.6 MG/1
2 TABLET ORAL
Qty: 0 | Refills: 0 | DISCHARGE
Start: 2018-08-26

## 2018-08-26 RX ORDER — MULTIVIT WITH MIN/MFOLATE/K2 340-15/3 G
150 POWDER (GRAM) ORAL ONCE
Qty: 0 | Refills: 0 | Status: COMPLETED | OUTPATIENT
Start: 2018-08-26 | End: 2018-08-26

## 2018-08-26 RX ORDER — CEFTRIAXONE 500 MG/1
1 INJECTION, POWDER, FOR SOLUTION INTRAMUSCULAR; INTRAVENOUS
Qty: 0 | Refills: 0 | COMMUNITY

## 2018-08-26 RX ORDER — OXYCODONE HYDROCHLORIDE 5 MG/1
1 TABLET ORAL
Qty: 20 | Refills: 0
Start: 2018-08-26

## 2018-08-26 RX ORDER — ACETAMINOPHEN 500 MG
1000 TABLET ORAL ONCE
Qty: 0 | Refills: 0 | Status: COMPLETED | OUTPATIENT
Start: 2018-08-26 | End: 2018-08-26

## 2018-08-26 RX ORDER — AZITHROMYCIN 500 MG/1
1 TABLET, FILM COATED ORAL
Qty: 0 | Refills: 0 | COMMUNITY

## 2018-08-26 RX ADMIN — Medication 100 MILLIGRAM(S): at 06:20

## 2018-08-26 RX ADMIN — Medication 150 MILLILITER(S): at 19:49

## 2018-08-26 RX ADMIN — Medication 1000 MILLIGRAM(S): at 06:19

## 2018-08-26 RX ADMIN — Medication 1 TABLET(S): at 11:40

## 2018-08-26 RX ADMIN — Medication 650 MILLIGRAM(S): at 16:10

## 2018-08-26 RX ADMIN — HEPARIN SODIUM 11 UNIT(S)/HR: 5000 INJECTION INTRAVENOUS; SUBCUTANEOUS at 11:41

## 2018-08-26 RX ADMIN — Medication 650 MILLIGRAM(S): at 03:25

## 2018-08-26 RX ADMIN — Medication 100 MILLIGRAM(S): at 16:11

## 2018-08-26 RX ADMIN — Medication 650 MILLIGRAM(S): at 17:00

## 2018-08-26 RX ADMIN — Medication 3 MILLIGRAM(S): at 22:03

## 2018-08-26 RX ADMIN — OXYCODONE HYDROCHLORIDE 5 MILLIGRAM(S): 5 TABLET ORAL at 22:34

## 2018-08-26 RX ADMIN — HEPARIN SODIUM 5000 UNIT(S): 5000 INJECTION INTRAVENOUS; SUBCUTANEOUS at 22:03

## 2018-08-26 RX ADMIN — Medication 400 MILLIGRAM(S): at 06:19

## 2018-08-26 RX ADMIN — OXYCODONE HYDROCHLORIDE 5 MILLIGRAM(S): 5 TABLET ORAL at 22:04

## 2018-08-26 RX ADMIN — HEPARIN SODIUM 10.5 UNIT(S)/HR: 5000 INJECTION INTRAVENOUS; SUBCUTANEOUS at 06:24

## 2018-08-26 RX ADMIN — Medication 0.25 MILLIGRAM(S): at 11:41

## 2018-08-26 RX ADMIN — Medication 650 MILLIGRAM(S): at 03:55

## 2018-08-26 RX ADMIN — POLYETHYLENE GLYCOL 3350 17 GRAM(S): 17 POWDER, FOR SOLUTION ORAL at 17:19

## 2018-08-26 NOTE — PROVIDER CONTACT NOTE (OTHER) - SITUATION
1-Patient requests to speak to PA/MD. Patient requests MRI results and has multiple questions  2- Anxiety  3- PTT 35.4

## 2018-08-26 NOTE — PROVIDER CONTACT NOTE (OTHER) - RECOMMENDATIONS
give mri results  evaluate for anti anxiety medication  evaluate to adjust IV heparin drip as patient on patient specific

## 2018-08-26 NOTE — PROVIDER CONTACT NOTE (OTHER) - ASSESSMENT
patient states he feels anxious and frustrated  patient continues to have dizziness and concern for his visual changes  neuro exam unchanged

## 2018-08-26 NOTE — PROGRESS NOTE ADULT - ASSESSMENT
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 Now scheduled: Cerebral Angiogram/ Embolization.     PROCEDURE:  8/21 s/p angiogram/ ryne embolization of dural fistula    PLAN:  Neuro:   - MRI today- vein size is decreasing   - continue heparin gtt for venous congestion increase to 11 as ptt not at goal.  Dr. Rojas to decide length as well if we need to bridge ptt goal 50-60  - oxycodone prn for pain  - pt ambulating freely on floor  - start xanax for anxiety  CV:  - vital signs stable  DVT ppx:   - venodynes while in bed  ID:   - treatment of Lyme- continue azithromycin 500mg IV daily and ceftriaxone 2 gm IV daily until 9/10/18 per private ID Dr Stew Knight. Patient to discuss with ID about stopping them  - via PICC line  PT/OT:   - no PT needs  -OT- outpatient vision therapy  dc early this week    [facility] # [phone]

## 2018-08-26 NOTE — PROGRESS NOTE ADULT - SUBJECTIVE AND OBJECTIVE BOX
OVERNIGHT EVENTS: multiple issues: patient is extremely anxious.  Patient states he is so nervous about the MRI and his AVF.  Patient states his vision is slightly better but is seeing floater.  He denies nausea and vomiting.  Patient also does not want his antibiotics anymore, as his lyme disease doctor in the past said he can stop it.      Vital Signs Last 24 Hrs  T(C): 37.3 (26 Aug 2018 08:08), Max: 37.3 (26 Aug 2018 08:08)  T(F): 99.2 (26 Aug 2018 08:08), Max: 99.2 (26 Aug 2018 08:08)  HR: 102 (26 Aug 2018 08:08) (57 - 102)  BP: 111/84 (26 Aug 2018 08:08) (111/84 - 130/80)  BP(mean): --  RR: 18 (26 Aug 2018 08:08) (16 - 18)  SpO2: 97% (26 Aug 2018 08:08) (96% - 98%)    PHYSICAL EXAM:    Constitutional: No Acute Distress     Neurological: AOx3, Following Commands, Moving all Extremities     Pulmonary: Clear to Auscultation, No rales, No rhonchi, No wheezes     Cardiovascular: S1, S2, Regular rate and rhythm     Gastrointestinal: Soft, Non-tender, Non-distended     Extremities: No calf tenderness     LABS:                        15.9   7.33  )-----------( 197      ( 26 Aug 2018 08:12 )             45.3        PTT - ( 26 Aug 2018 08:12 )  PTT:35.4 sec    08-25 @ 07:01 - 08-26 @ 07:00  --------------------------------------------------------  IN: 727.5 mL / OUT: 0 mL / NET: 727.5 mL    08-26 @ 07:01 - 08-26 @ 11:35  --------------------------------------------------------  IN: 360 mL / OUT: 0 mL / NET: 360 mL      DRAINS:     MEDICATIONS:  Anticoagulation:   heparin  Infusion 1050 Unit(s)/Hr IV Continuous <Continuous>    Antibiotics:  azithromycin  IVPB 500 milliGRAM(s) IV Intermittent every 24 hours  azithromycin  IVPB      cefTRIAXone   IVPB      cefTRIAXone   IVPB 2 Gram(s) IV Intermittent every 24 hours    Endo:    Neuro:  acetaminophen   Tablet 650 milliGRAM(s) Oral every 6 hours PRN For Temp greater than 38 C (100.4 F)  acetaminophen   Tablet. 650 milliGRAM(s) Oral every 6 hours PRN Mild Pain (1 - 3)  ALPRAZolam 0.25 milliGRAM(s) Oral every 8 hours PRN anxiety  melatonin 3 milliGRAM(s) Oral at bedtime  metoclopramide Injectable 10 milliGRAM(s) IV Push every 6 hours PRN nausea/vomiting  ondansetron Injectable 4 milliGRAM(s) IV Push every 6 hours PRN Nausea and/or Vomiting  oxyCODONE    IR 5 milliGRAM(s) Oral every 4 hours PRN Moderate Pain (4 - 6)  oxyCODONE    IR 10 milliGRAM(s) Oral every 4 hours PRN Severe Pain (7 - 10)    Cardiac:    Pulm:    GI/:  docusate sodium 100 milliGRAM(s) Oral two times a day  senna 2 Tablet(s) Oral at bedtime    Other:   multivitamin 1 Tablet(s) Oral daily    DIET: regular     IMAGING:

## 2018-08-26 NOTE — CHART NOTE - NSCHARTNOTEFT_GEN_A_CORE
spoke with private Infectious disease doctor, Dr Stew Knight, he recommended stopping the treatment for antibiotics at this time as patient finished 3 courses of antibiotics.  Patient is in agreement

## 2018-08-26 NOTE — PROVIDER CONTACT NOTE (OTHER) - ACTION/TREATMENT ORDERED:
MD aware, Per MD continue at current rate of 10ml/hr, Follow up aptt will be drawn in 6 hours, Will continue to monitor
xanax  obtained MRI results and spoke to patient  will evaluate heparin

## 2018-08-27 VITALS
DIASTOLIC BLOOD PRESSURE: 78 MMHG | RESPIRATION RATE: 16 BRPM | SYSTOLIC BLOOD PRESSURE: 116 MMHG | HEART RATE: 91 BPM | OXYGEN SATURATION: 94 % | TEMPERATURE: 98 F

## 2018-08-27 PROCEDURE — 75894 X-RAYS TRANSCATH THERAPY: CPT

## 2018-08-27 PROCEDURE — 80048 BASIC METABOLIC PNL TOTAL CA: CPT

## 2018-08-27 PROCEDURE — 84100 ASSAY OF PHOSPHORUS: CPT

## 2018-08-27 PROCEDURE — 36226 PLACE CATH VERTEBRAL ART: CPT

## 2018-08-27 PROCEDURE — C2628: CPT

## 2018-08-27 PROCEDURE — 97165 OT EVAL LOW COMPLEX 30 MIN: CPT

## 2018-08-27 PROCEDURE — 36012 PLACE CATHETER IN VEIN: CPT

## 2018-08-27 PROCEDURE — 70546 MR ANGIOGRAPH HEAD W/O&W/DYE: CPT

## 2018-08-27 PROCEDURE — 97162 PT EVAL MOD COMPLEX 30 MIN: CPT

## 2018-08-27 PROCEDURE — 71045 X-RAY EXAM CHEST 1 VIEW: CPT

## 2018-08-27 PROCEDURE — 70553 MRI BRAIN STEM W/O & W/DYE: CPT

## 2018-08-27 PROCEDURE — 83735 ASSAY OF MAGNESIUM: CPT

## 2018-08-27 PROCEDURE — C1887: CPT

## 2018-08-27 PROCEDURE — C1769: CPT

## 2018-08-27 PROCEDURE — 75898 FOLLOW-UP ANGIOGRAPHY: CPT

## 2018-08-27 PROCEDURE — C1889: CPT

## 2018-08-27 PROCEDURE — A9585: CPT

## 2018-08-27 PROCEDURE — 85610 PROTHROMBIN TIME: CPT

## 2018-08-27 PROCEDURE — 85027 COMPLETE CBC AUTOMATED: CPT

## 2018-08-27 PROCEDURE — 36224 PLACE CATH CAROTD ART: CPT

## 2018-08-27 PROCEDURE — 61626 TCAT PERM OCCLS/EMBOL NONCNS: CPT

## 2018-08-27 PROCEDURE — 70450 CT HEAD/BRAIN W/O DYE: CPT

## 2018-08-27 PROCEDURE — 36227 PLACE CATH XTRNL CAROTID: CPT

## 2018-08-27 PROCEDURE — 85730 THROMBOPLASTIN TIME PARTIAL: CPT

## 2018-08-27 PROCEDURE — C1894: CPT

## 2018-08-27 RX ADMIN — OXYCODONE HYDROCHLORIDE 5 MILLIGRAM(S): 5 TABLET ORAL at 11:44

## 2018-08-27 RX ADMIN — Medication 100 MILLIGRAM(S): at 06:36

## 2018-08-27 RX ADMIN — Medication 650 MILLIGRAM(S): at 00:30

## 2018-08-27 RX ADMIN — Medication 650 MILLIGRAM(S): at 15:04

## 2018-08-27 RX ADMIN — Medication 650 MILLIGRAM(S): at 06:53

## 2018-08-27 RX ADMIN — OXYCODONE HYDROCHLORIDE 5 MILLIGRAM(S): 5 TABLET ORAL at 12:20

## 2018-08-27 RX ADMIN — POLYETHYLENE GLYCOL 3350 17 GRAM(S): 17 POWDER, FOR SOLUTION ORAL at 07:39

## 2018-08-27 RX ADMIN — Medication 650 MILLIGRAM(S): at 18:44

## 2018-08-27 RX ADMIN — Medication 650 MILLIGRAM(S): at 07:25

## 2018-08-27 RX ADMIN — Medication 100 MILLIGRAM(S): at 18:42

## 2018-08-27 RX ADMIN — HEPARIN SODIUM 5000 UNIT(S): 5000 INJECTION INTRAVENOUS; SUBCUTANEOUS at 14:59

## 2018-08-27 RX ADMIN — Medication 1 ENEMA: at 12:21

## 2018-08-27 RX ADMIN — Medication 650 MILLIGRAM(S): at 00:00

## 2018-08-27 RX ADMIN — HEPARIN SODIUM 5000 UNIT(S): 5000 INJECTION INTRAVENOUS; SUBCUTANEOUS at 06:36

## 2018-08-27 RX ADMIN — Medication 1 TABLET(S): at 11:44

## 2018-08-27 NOTE — PROGRESS NOTE ADULT - SUBJECTIVE AND OBJECTIVE BOX
OVERNIGHT EVENTS:  patient is very anxious. Spoke with ID yesterday.  Patient is off antibiotics.      Vital Signs Last 24 Hrs  T(C): 36.6 (27 Aug 2018 07:30), Max: 37.3 (26 Aug 2018 08:08)  T(F): 97.8 (27 Aug 2018 07:30), Max: 99.2 (26 Aug 2018 08:08)  HR: 77 (27 Aug 2018 07:30) (67 - 105)  BP: 123/63 (27 Aug 2018 07:30) (101/69 - 127/82)  BP(mean): --  RR: 18 (27 Aug 2018 07:30) (16 - 20)  SpO2: 94% (27 Aug 2018 07:30) (94% - 98%)    PHYSICAL EXAM:    Constitutional: No Acute Distress     Neurological: AOx3, Following Commands, Moving all Extremities     Sensation: [x] intact to light touch  [] decreased:     Pulmonary: Clear to Auscultation, No rales, No rhonchi, No wheezes     Cardiovascular: S1, S2, Regular rate and rhythm     Gastrointestinal: Soft, Non-tender, Non-distended     Extremities: No calf tenderness     Incision:   LABS:                        15.9   7.33  )-----------( 197      ( 26 Aug 2018 08:12 )             45.3        PTT - ( 26 Aug 2018 08:12 )  PTT:35.4 sec    08-26 @ 07:01  -  08-27 @ 07:00  --------------------------------------------------------  IN: 1670 mL / OUT: 0 mL / NET: 1670 mL      DRAINS:     MEDICATIONS:  Anticoagulation:   heparin  Injectable 5000 Unit(s) SubCutaneous every 8 hours    Antibiotics:    Endo:    Neuro:  acetaminophen   Tablet 650 milliGRAM(s) Oral every 6 hours PRN For Temp greater than 38 C (100.4 F)  acetaminophen   Tablet. 650 milliGRAM(s) Oral every 6 hours PRN Mild Pain (1 - 3)  acetaminophen  IVPB. 1000 milliGRAM(s) IV Intermittent once  ALPRAZolam 0.25 milliGRAM(s) Oral every 8 hours PRN anxiety  melatonin 3 milliGRAM(s) Oral at bedtime  ondansetron Injectable 4 milliGRAM(s) IV Push every 6 hours PRN Nausea and/or Vomiting  oxyCODONE    IR 5 milliGRAM(s) Oral every 4 hours PRN Moderate Pain (4 - 6)    Cardiac:    Pulm:    GI/:  docusate sodium 100 milliGRAM(s) Oral two times a day  polyethylene glycol 3350 17 Gram(s) Oral daily  senna 2 Tablet(s) Oral at bedtime    Other:   multivitamin 1 Tablet(s) Oral daily    DIET: regular    IMAGING:

## 2018-08-27 NOTE — PROGRESS NOTE ADULT - PROVIDER SPECIALTY LIST ADULT
NSICU
Neurosurgery
NSICU

## 2018-08-27 NOTE — PROGRESS NOTE ADULT - ASSESSMENT
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 Now scheduled: Cerebral Angiogram/ Embolization.     PROCEDURE:  8/21 s/p angiogram/ ryne embolization of dural fistula    PLAN:  Neuro:   Per Dr. Rojas heparin is stopped as MRI improving  - oxycodone prn for pain  - pt ambulating freely on floor  - start xanax for anxiety  CV:  - vital signs stable  DVT ppx:   - venodynes while in bed  ID:   -Per Dr. Knight all antibiotics have been stopped   PT/OT:   - no PT needs  -OT- outpatient vision therapy  dc today    [facility] # [id number]

## 2018-08-28 ENCOUNTER — EMERGENCY (EMERGENCY)
Facility: HOSPITAL | Age: 48
LOS: 1 days | Discharge: HOME | End: 2018-08-28
Attending: STUDENT IN AN ORGANIZED HEALTH CARE EDUCATION/TRAINING PROGRAM
Payer: COMMERCIAL

## 2018-08-28 VITALS
SYSTOLIC BLOOD PRESSURE: 123 MMHG | OXYGEN SATURATION: 99 % | RESPIRATION RATE: 16 BRPM | TEMPERATURE: 99 F | DIASTOLIC BLOOD PRESSURE: 77 MMHG | HEART RATE: 74 BPM

## 2018-08-28 VITALS
RESPIRATION RATE: 18 BRPM | HEART RATE: 88 BPM | TEMPERATURE: 98 F | SYSTOLIC BLOOD PRESSURE: 126 MMHG | WEIGHT: 145.06 LBS | DIASTOLIC BLOOD PRESSURE: 86 MMHG | HEIGHT: 69 IN | OXYGEN SATURATION: 99 %

## 2018-08-28 DIAGNOSIS — Z98.890 OTHER SPECIFIED POSTPROCEDURAL STATES: Chronic | ICD-10-CM

## 2018-08-28 LAB
ALBUMIN SERPL ELPH-MCNC: 4.9 G/DL — SIGNIFICANT CHANGE UP (ref 3.3–5)
ALP SERPL-CCNC: 39 U/L — LOW (ref 40–120)
ALT FLD-CCNC: 19 U/L — SIGNIFICANT CHANGE UP (ref 10–45)
ANION GAP SERPL CALC-SCNC: 15 MMOL/L — SIGNIFICANT CHANGE UP (ref 5–17)
AST SERPL-CCNC: 23 U/L — SIGNIFICANT CHANGE UP (ref 10–40)
BASOPHILS # BLD AUTO: 0.1 K/UL — SIGNIFICANT CHANGE UP (ref 0–0.2)
BASOPHILS NFR BLD AUTO: 0.6 % — SIGNIFICANT CHANGE UP (ref 0–2)
BILIRUB SERPL-MCNC: 1.5 MG/DL — HIGH (ref 0.2–1.2)
BUN SERPL-MCNC: 17 MG/DL — SIGNIFICANT CHANGE UP (ref 7–23)
CALCIUM SERPL-MCNC: 10 MG/DL — SIGNIFICANT CHANGE UP (ref 8.4–10.5)
CHLORIDE SERPL-SCNC: 95 MMOL/L — LOW (ref 96–108)
CO2 SERPL-SCNC: 25 MMOL/L — SIGNIFICANT CHANGE UP (ref 22–31)
CREAT SERPL-MCNC: 1.16 MG/DL — SIGNIFICANT CHANGE UP (ref 0.5–1.3)
EOSINOPHIL # BLD AUTO: 0.2 K/UL — SIGNIFICANT CHANGE UP (ref 0–0.5)
EOSINOPHIL NFR BLD AUTO: 2.8 % — SIGNIFICANT CHANGE UP (ref 0–6)
GLUCOSE SERPL-MCNC: 99 MG/DL — SIGNIFICANT CHANGE UP (ref 70–99)
HCT VFR BLD CALC: 46.4 % — SIGNIFICANT CHANGE UP (ref 39–50)
HGB BLD-MCNC: 16 G/DL — SIGNIFICANT CHANGE UP (ref 13–17)
LYMPHOCYTES # BLD AUTO: 2 K/UL — SIGNIFICANT CHANGE UP (ref 1–3.3)
LYMPHOCYTES # BLD AUTO: 23.4 % — SIGNIFICANT CHANGE UP (ref 13–44)
MCHC RBC-ENTMCNC: 30.2 PG — SIGNIFICANT CHANGE UP (ref 27–34)
MCHC RBC-ENTMCNC: 34.5 GM/DL — SIGNIFICANT CHANGE UP (ref 32–36)
MCV RBC AUTO: 87.5 FL — SIGNIFICANT CHANGE UP (ref 80–100)
MONOCYTES # BLD AUTO: 0.6 K/UL — SIGNIFICANT CHANGE UP (ref 0–0.9)
MONOCYTES NFR BLD AUTO: 7.5 % — SIGNIFICANT CHANGE UP (ref 2–14)
NEUTROPHILS # BLD AUTO: 5.7 K/UL — SIGNIFICANT CHANGE UP (ref 1.8–7.4)
NEUTROPHILS NFR BLD AUTO: 65.6 % — SIGNIFICANT CHANGE UP (ref 43–77)
PLATELET # BLD AUTO: 246 K/UL — SIGNIFICANT CHANGE UP (ref 150–400)
POTASSIUM SERPL-MCNC: 3.9 MMOL/L — SIGNIFICANT CHANGE UP (ref 3.5–5.3)
POTASSIUM SERPL-SCNC: 3.9 MMOL/L — SIGNIFICANT CHANGE UP (ref 3.5–5.3)
PROT SERPL-MCNC: 8 G/DL — SIGNIFICANT CHANGE UP (ref 6–8.3)
RBC # BLD: 5.3 M/UL — SIGNIFICANT CHANGE UP (ref 4.2–5.8)
RBC # FLD: 11.9 % — SIGNIFICANT CHANGE UP (ref 10.3–14.5)
SODIUM SERPL-SCNC: 135 MMOL/L — SIGNIFICANT CHANGE UP (ref 135–145)
WBC # BLD: 8.7 K/UL — SIGNIFICANT CHANGE UP (ref 3.8–10.5)
WBC # FLD AUTO: 8.7 K/UL — SIGNIFICANT CHANGE UP (ref 3.8–10.5)

## 2018-08-28 PROCEDURE — 70450 CT HEAD/BRAIN W/O DYE: CPT

## 2018-08-28 PROCEDURE — 70450 CT HEAD/BRAIN W/O DYE: CPT | Mod: 26

## 2018-08-28 PROCEDURE — 85027 COMPLETE CBC AUTOMATED: CPT

## 2018-08-28 PROCEDURE — 99285 EMERGENCY DEPT VISIT HI MDM: CPT | Mod: 25

## 2018-08-28 PROCEDURE — 96375 TX/PRO/DX INJ NEW DRUG ADDON: CPT

## 2018-08-28 PROCEDURE — 96376 TX/PRO/DX INJ SAME DRUG ADON: CPT

## 2018-08-28 PROCEDURE — 96374 THER/PROPH/DIAG INJ IV PUSH: CPT

## 2018-08-28 PROCEDURE — 80053 COMPREHEN METABOLIC PANEL: CPT

## 2018-08-28 RX ORDER — GABAPENTIN 400 MG/1
1 CAPSULE ORAL
Qty: 7 | Refills: 0
Start: 2018-08-28 | End: 2018-09-03

## 2018-08-28 RX ORDER — ACETAMINOPHEN 500 MG
650 TABLET ORAL ONCE
Qty: 0 | Refills: 0 | Status: COMPLETED | OUTPATIENT
Start: 2018-08-28 | End: 2018-08-28

## 2018-08-28 RX ORDER — MAGNESIUM SULFATE 500 MG/ML
2 VIAL (ML) INJECTION ONCE
Qty: 0 | Refills: 0 | Status: COMPLETED | OUTPATIENT
Start: 2018-08-28 | End: 2018-08-28

## 2018-08-28 RX ORDER — ALPRAZOLAM 0.25 MG
0.25 TABLET ORAL ONCE
Qty: 0 | Refills: 0 | Status: DISCONTINUED | OUTPATIENT
Start: 2018-08-28 | End: 2018-08-28

## 2018-08-28 RX ORDER — DIAZEPAM 5 MG
1 TABLET ORAL
Qty: 12 | Refills: 0
Start: 2018-08-28 | End: 2018-08-31

## 2018-08-28 RX ORDER — SODIUM CHLORIDE 9 MG/ML
1000 INJECTION INTRAMUSCULAR; INTRAVENOUS; SUBCUTANEOUS ONCE
Qty: 0 | Refills: 0 | Status: COMPLETED | OUTPATIENT
Start: 2018-08-28 | End: 2018-08-28

## 2018-08-28 RX ORDER — METOCLOPRAMIDE HCL 10 MG
10 TABLET ORAL ONCE
Qty: 0 | Refills: 0 | Status: COMPLETED | OUTPATIENT
Start: 2018-08-28 | End: 2018-08-28

## 2018-08-28 RX ORDER — DIAZEPAM 5 MG
5 TABLET ORAL ONCE
Qty: 0 | Refills: 0 | Status: DISCONTINUED | OUTPATIENT
Start: 2018-08-28 | End: 2018-08-28

## 2018-08-28 RX ORDER — GABAPENTIN 400 MG/1
100 CAPSULE ORAL ONCE
Qty: 0 | Refills: 0 | Status: COMPLETED | OUTPATIENT
Start: 2018-08-28 | End: 2018-08-28

## 2018-08-28 RX ADMIN — GABAPENTIN 100 MILLIGRAM(S): 400 CAPSULE ORAL at 06:06

## 2018-08-28 RX ADMIN — Medication 10 MILLIGRAM(S): at 09:48

## 2018-08-28 RX ADMIN — Medication 5 MILLIGRAM(S): at 06:06

## 2018-08-28 RX ADMIN — Medication 0.25 MILLIGRAM(S): at 05:01

## 2018-08-28 RX ADMIN — Medication 10 MILLIGRAM(S): at 04:51

## 2018-08-28 RX ADMIN — Medication 650 MILLIGRAM(S): at 06:25

## 2018-08-28 RX ADMIN — SODIUM CHLORIDE 1000 MILLILITER(S): 9 INJECTION INTRAMUSCULAR; INTRAVENOUS; SUBCUTANEOUS at 06:25

## 2018-08-28 RX ADMIN — Medication 50 GRAM(S): at 06:25

## 2018-08-28 NOTE — ED PROVIDER NOTE - ATTENDING CONTRIBUTION TO CARE
49 yo male pmh anxiety dural AVF s/p embolization last week, discharged 8/27 present to ED for frontal/fronto temporal HA as well as intermittent paraspinal muscle spasm with movements. Patient unable to sleep because of discomfort. Patient has been having decreased vision but no acute vision changes. No hearing changes, no numbness or weakness.  nausea or vomiting.    GEN: no acute respiratory distress. nontoxic, speaking comfortably in full sentences  HEENT: NCAT. face symmetrical. PERRL 4mm, EOMI, MMM, oropharynx wnl.  Neck: no JVD, trachea midline, no LAD  CV: RRR. +S1S2, no murmur. 2+ pulses in 4 extremities  Chest: CTA B/l. no wheezing, rales, rhonchi. no retractions. good air movement  ABD: +BS, soft, non distended, non tender. No guarding/rebound.   MSK: No clubbing, cyanosis, edema. FROM of all extremities, no tenderness to palpation. No midline tenderness. + paraspinal tenderness in upper t spine and lumbar region. no spinal step-offs.  Neuro: AOOX3.  CN 2-12 intact; Sensation intact, motor 5/5 throughout. no ataxia  SKIN: No erythema, lesions or rash    HA and paraspinal muscle spasm. recent NSX procedure/admission. symptomaic relief. CTB, NSX consult. reassess.

## 2018-08-28 NOTE — ED PROVIDER NOTE - MEDICAL DECISION MAKING DETAILS
49yo male pmh dAVF s/p embolization p/w headache, normal neuro exam. Reglan, labs, CT head non contrast. Neurosurgery aware of patient.

## 2018-08-28 NOTE — ED ADULT NURSE REASSESSMENT NOTE - NS ED NURSE REASSESS COMMENT FT1
Received report from night LYNN Holden. Patient currently resting in [address]. He reports that he feels "a little out of it" but headache and back pain have improved. Awaiting dispo. Patient aware of plan.

## 2018-08-28 NOTE — ED PROVIDER NOTE - PHYSICAL EXAMINATION
Neuro: Awake, alert and fully oriented x 4. No evidence of cognitive or language dysfunction.  Cranial nerves: Visual fields are full. Extraocular movements full. Pupils equal, round, react to light. No nystagmus noted. Fifth nerve function is normal. There is no facial asymmetry noted. CN XII intact.   Motor exam: good tone and bulk throughout. No pronator drift. Muscle strength is 5/5 throughout. Sensory examination is intact.

## 2018-08-28 NOTE — ED ADULT NURSE NOTE - NSIMPLEMENTINTERV_GEN_ALL_ED
Implemented All Universal Safety Interventions:  [place] to call system. Call bell, personal items and telephone within reach. Instruct patient to call for assistance. Room bathroom lighting operational. Non-slip footwear when patient is off stretcher. Physically safe environment: no spills, clutter or unnecessary equipment. Stretcher in lowest position, wheels locked, appropriate side rails in place.

## 2018-08-28 NOTE — CONSULT NOTE ADULT - SUBJECTIVE AND OBJECTIVE BOX
p ([id number])     HPI: 49yo male pmh dural AVF s/p embolization last week, discharged this morning returns with headaches. C/o bitemporal fullness, moderate a/w sharp pains along spine with movements, occasionally at rest. Unable to focus, unable to sleep. Took tylenol ~4 hours ago with no improvements. No NSAIDS. No trauma. Denies fever, chills, chest pain, dyspnea, palpitations, dizziness, weakness, recent cough, nausea, vomiting, diarrhea, abdominal pain, dysuria, urinary or fecal incontinence, BRBPR, melena, leg swelling, sick contact, recent long travel. Sp embo of rt occipital dural avm.    PAST MEDICAL HISTORY   Lyme disease  DAVF (dural arteriovenous fistula)  Hypotestosteronism    PAST SURGICAL HISTORY   Status post colonoscopy  No significant past surgical history    Avelox (Other)  Cipro (Other)  Levaquin (Other)      MEDICATIONS:  Antibiotics:    Neuro:    Anticoagulation:    Other:      SOCIAL HISTORY:   Occupation:   Marital Status:     FAMILY HISTORY:      REVIEW OF SYSTEMS:  Check here if all are normal other than Neurological/HPI [x]  General:  Eyes:  ENT:  Cardiac:  Respiratory:  GI:  Musculoskeletal:   Skin:  Neurologic:   Psychiatric:     PHYSICAL EXAMINATION:   T(C): 36.8 (08-28-18 @ 04:27), Max: 36.9 (08-28-18 @ 03:56)  HR: 79 (08-28-18 @ 04:27) (77 - 91)  BP: 121/56 (08-28-18 @ 04:27) (116/78 - 126/86)  RR: 18 (08-28-18 @ 04:27) (16 - 18)  SpO2: 99% (08-28-18 @ 04:27) (94% - 99%)  Wt(kg): --Height (cm): 175.26 (08-28 @ 03:56)  Weight (kg): 65.8 (08-28 @ 03:56)    General Examination:     Neurologic Examination:           AOx3, FC, PERRL, EOMI, no facial   5/5 throughout, no drift  SILT  no clonus      LABS:                        16.0   8.7   )-----------( 246      ( 28 Aug 2018 04:44 )             46.4     08-28    135  |  95<L>  |  17  ----------------------------<  99  3.9   |  25  |  1.16    Ca    10.0      28 Aug 2018 04:44    TPro  8.0  /  Alb  4.9  /  TBili  1.5<H>  /  DBili  x   /  AST  23  /  ALT  19  /  AlkPhos  39<L>  08-28    PTT - ( 26 Aug 2018 08:12 )  PTT:35.4 sec      RADIOLOGY & ADDITIONAL STUDIES:

## 2018-08-28 NOTE — ED PROVIDER NOTE - OBJECTIVE STATEMENT
49yo male pmh dural AVF s/p embolization last week, discharged this morning returns with headaches. C/o bitemporal fullness, moderate a/w sharp pains along spine with movements, occasionally at rest. Unable to focus, unable to sleep. Took tylenol ~4 hours ago with no improvements. No NSAIDS. No trauma. Denies fever, chills, chest pain, dyspnea, palpitations, dizziness, weakness, recent cough, nausea, vomiting, diarrhea, abdominal pain, dysuria, urinary or fecal incontinence, BRBPR, melena, leg swelling, sick contact, recent long travel.    Significant past medical hx/surgical hx/social hx and review of systems can be found above in the history of present illness.

## 2018-08-28 NOTE — CONSULT NOTE ADULT - ASSESSMENT
47yo male pmh dural AVF s/p embolization last week. CTH negative.    Pain control, gabapentin, valium, fu outpatient

## 2018-08-28 NOTE — ED ADULT NURSE NOTE - OBJECTIVE STATEMENT
47 y/o M patient presents to ED from home c/o HA and head pressure. As per patient he had surgery last week due to swelling of the right occipital lobe. Patient states he was unable to sleep and began to feel generalized pressure throughout the head and pain throughout the entire back. Patient reports vison changes and states "I can't focus on anything." As per patient he took "3 oxycodone tablets" with no relief. Patient reports he had a PICC line removed today for previous suspected Lyme disease. Patient reports recent constipation due to use of oxycodone. Patient A&Ox3. lungs CTA. skin warm and intact. abdomen soft, non tender, non distended. equal strength, sensation, movement in upper and lower extremities. ambulatory. Patient denies  SOB, chest pain, abdominal pain, N/V/D, bowel/bladder changes, fevers/chills. VSS. Safety and comfort measures provided and maintained. [place] bedside. MD bedside.

## 2018-08-28 NOTE — ED PROVIDER NOTE - PMH
DAVF (dural arteriovenous fistula)  dx: ' 2017  Hypotestosteronism    Lyme disease  suspected. Treated 5/2018 to 8/2018 with I.V. Antibiotic via PIC Line

## 2018-08-28 NOTE — ED PROVIDER NOTE - PROGRESS NOTE DETAILS
[facility] PGY-3: Jayson from Neurosurgery called, spoke to Dr. Rojas, states pt. ok to be discharged home with Gabapentin and Valium and f/u w/ NSx in 1 week. -Eliseo Mckeon MD- signed out to me by dr grier to f/u Neurosurgery recs. cleared by Neurosurgery. wants to go home.

## 2018-08-29 ENCOUNTER — OTHER (OUTPATIENT)
Age: 48
End: 2018-08-29

## 2018-08-31 ENCOUNTER — OTHER (OUTPATIENT)
Age: 48
End: 2018-08-31

## 2018-09-03 ENCOUNTER — EMERGENCY (EMERGENCY)
Facility: HOSPITAL | Age: 48
LOS: 1 days | Discharge: HOME | End: 2018-09-03
Attending: EMERGENCY MEDICINE | Admitting: EMERGENCY MEDICINE
Payer: COMMERCIAL

## 2018-09-03 VITALS
TEMPERATURE: 98 F | DIASTOLIC BLOOD PRESSURE: 83 MMHG | SYSTOLIC BLOOD PRESSURE: 123 MMHG | HEART RATE: 62 BPM | OXYGEN SATURATION: 99 % | RESPIRATION RATE: 18 BRPM

## 2018-09-03 VITALS
DIASTOLIC BLOOD PRESSURE: 72 MMHG | SYSTOLIC BLOOD PRESSURE: 137 MMHG | WEIGHT: 145.06 LBS | HEIGHT: 68 IN | OXYGEN SATURATION: 99 % | RESPIRATION RATE: 18 BRPM | HEART RATE: 82 BPM | TEMPERATURE: 98 F

## 2018-09-03 DIAGNOSIS — Z98.890 OTHER SPECIFIED POSTPROCEDURAL STATES: Chronic | ICD-10-CM

## 2018-09-03 PROCEDURE — 99284 EMERGENCY DEPT VISIT MOD MDM: CPT | Mod: 25

## 2018-09-03 PROCEDURE — 93005 ELECTROCARDIOGRAM TRACING: CPT

## 2018-09-03 PROCEDURE — 93010 ELECTROCARDIOGRAM REPORT: CPT

## 2018-09-03 PROCEDURE — 70450 CT HEAD/BRAIN W/O DYE: CPT

## 2018-09-03 PROCEDURE — 70450 CT HEAD/BRAIN W/O DYE: CPT | Mod: 26

## 2018-09-03 NOTE — ED PROVIDER NOTE - OBJECTIVE STATEMENT
47 yo male with hx of lymes disease, successfully treated, Dural AVF embolization with Dr. Rojas on 8/21 seen today for left arm numbness and tingling. he noticed this when he woke up this morning, was his entire arm and hand, 49 yo male with hx of lymes disease, successfully treated, Dural AVF embolization with Dr. Rojas on 8/21 seen today for left arm numbness and tingling. he noticed this when he woke up this morning, was his entire arm and last three digits of his hand. He notes the episode lasted only for a few minutes before dissipating but worried patient and came to the ED. No fevers, chills, headache, visual changes, changes in speech, ambulation, coordination, no new confusion or neurological defects. 49 yo male with hx of lymes disease, successfully treated, Dural AVF embolization with Dr. Rojas on 8/21 seen today for left arm numbness and tingling. he noticed this when he woke up this morning, was his entire arm and but mostly last three digits of his hand and palmar surface and felt he could not fully extend his hand or last 3 digits. He notes the episode lasted only for a few minutes before dissipating but worried patient and came to the ED. Had a bad headache the night before for which he took xanax and oxycodone before bed and gabapentin throughout the day. No headache when waking or since waking. Has some chronic visual changes since surgery, no acute changes. No fevers, chills, headache, visual changes, changes in speech, ambulation, coordination, no new confusion or neurological defects.

## 2018-09-03 NOTE — ED PROVIDER NOTE - MEDICAL DECISION MAKING DETAILS
49 yo male with hx of dural AVF embolization with dr. ramirez on 8/21 with residual improving headaches here for transient Left posterior arm and last three digit numbness/tinging x few minutes upon waking this morning which worried him and came to be examined.   no current complaints of numbness/tingling  -no neuro defects on examination  will consult neurosurgery for recs, ddx includes radial nerve palsy 47 yo male with hx of dural AVF embolization with dr. ramirez on 8/21 with residual improving headaches here for transient Left posterior arm and last three digit numbness/tinging x few minutes upon waking this morning which worried him and came to be examined.   no current complaints of numbness/tingling  -no neuro defects on examination  will consult neurosurgery for recs, ddx includes saturday night palsy given relation to sleep and heavy seadative use prior to sleep

## 2018-09-03 NOTE — ED ADULT NURSE NOTE - NSIMPLEMENTINTERV_GEN_ALL_ED
Implemented All Fall with Harm Risk Interventions:  [place] to call system. Call bell, personal items and telephone within reach. Instruct patient to call for assistance. Room bathroom lighting operational. Non-slip footwear when patient is off stretcher. Physically safe environment: no spills, clutter or unnecessary equipment. Stretcher in lowest position, wheels locked, appropriate side rails in place. Provide visual cue, wrist band, yellow gown, etc. Monitor gait and stability. Monitor for mental status changes and reorient to person, place, and time. Review medications for side effects contributing to fall risk. Reinforce activity limits and safety measures with patient and family. Provide visual clues: red socks.

## 2018-09-03 NOTE — PROGRESS NOTE ADULT - ASSESSMENT
48m s/p angio/embo of AVF with Dr. Rojas presented to ED with transient LUE numbness this AM, who is now neurointact  - No further imaging, likely compressive etiology  - F/u outpatient with Dr. Rojas as scheduled or sooner if symptoms return or become more severe

## 2018-09-03 NOTE — ED ADULT TRIAGE NOTE - CHIEF COMPLAINT QUOTE
pt awoke this morning had tingling numbness to left upper extremity lasted 1 min resolved pt states dizziness which he has had since neuro surgery 8/21 for DAVF

## 2018-09-03 NOTE — PROGRESS NOTE ADULT - SUBJECTIVE AND OBJECTIVE BOX
p ([id number])     HPI:  47 yo male with hx of lymes disease, successfully treated, Dural AVF embolization with Dr. Rojas on 8/21 seen today for left arm numbness and tingling. he noticed this when he woke up this morning, was his entire arm and but mostly last three digits of his hand and palmar surface and felt he could not fully extend his hand or last 3 digits. He notes the episode lasted only for a few minutes before dissipating but worried patient and came to the ED. Had a headache the night before for which he took xanax and oxycodone before bed and gabapentin throughout the day. No headache when waking or since waking. Has some chronic visual changes since surgery, no acute changes. No fevers, chills, headache, visual changes, changes in speech, ambulation, coordination, no new confusion or neurological defects    Imaging: CT WNL    Exam:  AOx3, FC, PERRL, EOMI, V1-3 intact, no facial, palate kathie symmetric, tongue midline, shrug 5/5  5/5 throughout, no drift  SILT  No clonus or babinski  =====================  PAST MEDICAL HISTORY   Lyme disease  DAVF (dural arteriovenous fistula)  Hypotestosteronism    PAST SURGICAL HISTORY   Status post colonoscopy  No significant past surgical history    Avelox (Other)  Cipro (Other)  Levaquin (Other)      MEDICATIONS:  Antibiotics:    Neuro:    Anticoagulation:    Other:      SOCIAL HISTORY:   Occupation:   Marital Status:     FAMILY HISTORY:      ROS: Negative except per HPI    LABS:

## 2018-09-03 NOTE — ED ADULT NURSE NOTE - OBJECTIVE STATEMENT
47 y/o M, A&Ox4, ambulates into ED w/ c/o L. arm numbness. Pt. reports he had a DAVF embolized on 8/21. Pt. reports he woke up this AM w/ L. arm numbness/tingling. Pt. states that the post MRI/MRA revealed brain swelling and pt. presents w/ concern for hemorrhage. No numbness/tingling apart from LUE. Pt. reports numbness throughout entire L. arm, however, it is more pronounced in digits 3-5 on L. hand. Pt. also endorses feeling weaker on L. arm. Equal strength and sensation to all extremities. No facial numbness/asymmetry. Pt. reports feeling off balance, but states that has been constant since the surgery. Pt. also reports visual deficits - no blurred vision, however, pt. states he is experiencing "lack of ability to focus." Upon assessing pt.'s vision, pt. unable to determine accurate number of fingers being held up. Pt. also endorses photosensitivity. Normal gaze. Coordination intact. Pt. also reports experiencing 47 y/o M, A&Ox4, ambulates into ED w/ c/o L. arm numbness. Pt. reports he had a DAVF embolized on 8/21. Pt. reports he woke up this AM w/ L. arm numbness/tingling. Pt. states that the post MRI/MRA revealed brain swelling and pt. presents w/ concern for hemorrhage. No numbness/tingling apart from LUE. Pt. reports numbness throughout entire L. arm, however, it is more pronounced in digits 3-5 on L. hand. Pt. also endorses feeling weaker on L. arm. Equal strength and sensation to all extremities. No facial numbness/asymmetry. Pt. reports feeling off balance, but states that has been constant since the surgery. Pt. also reports visual deficits - no blurred vision, however, pt. states he is experiencing "lack of ability to focus." Upon assessing pt.'s vision, pt. unable to determine accurate number of fingers being held up. Pt. also endorses photosensitivity. Normal gaze. Coordination intact. Pt. also reports experiencing deficits w/ depth perception. No dizziness. Pt. states he did have a severe headache last night accompanied by head pressure and pt. reports taking tylenol, oxycodone and xanax w/ some relief. Pt. endorses dull pressure currently. Pt. also expresses back pain. No head trauma/falls. No blood thinners. No fever/chills. No n/v. No urinary/bowel incontinence. Skin warm, dry and intact. Safety and comfort provided. Call bell within reach. Son at bedside. 47 y/o M, A&Ox4, ambulates into ED w/ c/o L. arm numbness. Pt. reports he had a DAVF embolized on 8/21. Pt. reports he woke up this AM w/ L. arm numbness/tingling. Pt. states that the post MRI/MRA revealed brain swelling and pt. presents w/ concern for hemorrhage. No numbness/tingling apart from LUE. Pt. reports numbness throughout entire L. arm, however, it is more pronounced in digits 3-5 on L. hand. Pt. also endorses feeling weaker on L. arm. Equal strength and sensation to all extremities. No facial numbness/asymmetry. Clear speech. Pt. reports feeling off balance, but states that has been constant since the surgery. Pt. also reports visual deficits - no blurred vision, however, pt. states he is experiencing "lack of ability to focus." Upon assessing pt.'s vision, pt. unable to determine accurate number of fingers being held up. Pt. also endorses photosensitivity. Normal gaze. Coordination intact. Pupils 2 mm in size, PERRL. Pt. also reports experiencing deficits w/ depth perception. No dizziness. Pt. states he did have a severe headache last night accompanied by head pressure and pt. reports taking tylenol, oxycodone and xanax w/ some relief. Pt. endorses dull pressure currently. Pt. also expresses back pain. No head trauma/falls. No blood thinners. No fever/chills. No n/v. No urinary/bowel incontinence. Skin warm, dry and intact. Safety and comfort provided. Call bell within reach. Son at bedside. 47 y/o M, A&Ox4, ambulates into ED w/ c/o L. arm numbness. Pt. reports he had a DAVF embolized on 8/21. Pt. reports he woke up this AM w/ L. arm numbness/tingling. Pt. states that the post MRI/MRA revealed brain swelling and pt. presents w/ concern for hemorrhage. No numbness/tingling apart from LUE. Pt. reports numbness throughout entire L. arm, however, it is more pronounced in digits 3-5 on L. hand. Pt. also endorses feeling weaker on L. arm. Equal strength and sensation to all extremities. No facial numbness/asymmetry. Clear speech. Pt. reports feeling off balance, but states that has been constant since the surgery. Pt. also reports visual deficits - no blurred vision, however, pt. states he is experiencing "lack of ability to focus." Upon assessing pt.'s vision, pt. unable to determine accurate number of fingers being held up. Pt. also endorses photosensitivity. Normal gaze. Coordination intact. Pupils 2 mm in size, PERRL. Pt. also reports experiencing deficits w/ depth perception. No dizziness. Pt. states he did have a severe headache last night accompanied by head pressure and pt. reports taking tylenol, oxycodone and xanax w/ some relief. Pt. endorses dull pressure currently. Pt. also expresses back pain. No head trauma/falls. No blood thinners. No fever/chills. No n/v. No urinary/bowel incontinence. Skin warm, dry and intact. Safety and comfort provided. Call bell within reach. Dysphagia screen done. Neuro flow sheet in chart. Son at bedside.

## 2018-09-03 NOTE — ED PROVIDER NOTE - CARE PLAN
Principal Discharge DX:	Neuropathy  Assessment and plan of treatment:	1. follow up with PMd in 24-48 hours  2. follow up with neurologist this week  3. keep hydrated  4. do not use pain medications at the same time as your anxiety medications (valium, xanex)  5. return to the ED if you have worsening headaches, recurrent neuropathy, fevers, chills, worening or concerning symptoms. Principal Discharge DX:	Neuropathy  Assessment and plan of treatment:	1. follow up with PMd in 24-48 hours  2. follow up with neurologist this week  3. keep hydrated  4. do not use pain medications at the same time as your anxiety medications (valium, xanex)  5. return to the ED if you have worsening headaches, recurrent neuropathy, fevers, chills, worsening or concerning symptoms. Principal Discharge DX:	Weakness of left arm  Assessment and plan of treatment:	1. follow up with PMd in 24-48 hours  2. follow up with neurologist this week  3. keep hydrated  4. do not use pain medications at the same time as your anxiety medications (valium, xanex)  5. return to the ED if you have worsening headaches, recurrent neuropathy, fevers, chills, worsening or concerning symptoms.  Secondary Diagnosis:	Chronic nonintractable headache, unspecified headache type  Secondary Diagnosis:	Neuropathy

## 2018-09-03 NOTE — ED PROVIDER NOTE - PLAN OF CARE
1. follow up with PMd in 24-48 hours  2. follow up with neurologist this week  3. keep hydrated  4. do not use pain medications at the same time as your anxiety medications (valium, xanex)  5. return to the ED if you have worsening headaches, recurrent neuropathy, fevers, chills, worening or concerning symptoms. 1. follow up with PMd in 24-48 hours  2. follow up with neurologist this week  3. keep hydrated  4. do not use pain medications at the same time as your anxiety medications (valium, xanex)  5. return to the ED if you have worsening headaches, recurrent neuropathy, fevers, chills, worsening or concerning symptoms.

## 2018-09-03 NOTE — ED PROVIDER NOTE - ATTENDING CONTRIBUTION TO CARE
VITALS: reviewed  GEN: NAD, A & O x 4  HEAD/EYES: NCAT, PERRL, EOMI, anicteric sclerae, no conjunctival pallor, R-gaze evoled fats beating nystagmus, fatiguable  ENT: mucus membranes moist, oropharynx WNL, trachea midline, no JVD  RESP: lungs CTA with equal breath sounds bilaterally, chest wall nontender and atraumatic  CV: heart with reg rhythm S1, S2, no murmur; distal pulses intact and symmetric bilaterally  ABDOMEN: normoactive bowel sounds, soft, nondistended, nontender, no palpable masses  : no CVAT  MSK: extremities atraumatic and nontender, no edema, no asymmetry. the back is without midline or lateral tenderness, there is no spinal deformity or stepoff and the back is ranged painlessly. the neck has no midline tenderness, deformity, or stepoff, and is ranged painlessly. negative spurling sign bl  SKIN: warm, dry, no rash, no bruising, no cyanosis. color appropriate for ethnicity  NEURO: Normal mentation, GCS15, CNII-XII are intact, strength is 5/5 throughout upper and lower extremities symmetric bilaterally, specifically intact in biceps, tricepsa dn all fingers in flexion and extension on LUE. Sensation is intact to light touch throughout trunk and extremities symmetric bilaterally, Cerebellar function is intact by finger-nose-finger, 2+ tricep and brachioradialis reflex symmetric bilaterally, neg simpson's bilaterally, Negative Romberg, Normal Gait   PSYCH: Affect appropriate     MDM: PT presents s/p AVF embolization. headache (acute on chronic) prior to sleep, no symptoms now, except for nystagmus which pt says is chronic no acute deficits. descripion of symptoms sounds like "'s tip" and sounds most c/w saturday night palsy given arm heavy use of sedatives before bed and  relation to sleep. given headache last night will get CT to r/o recurrent bleed, but low suppicion. NSG consulted given relation to recent procedure.

## 2018-09-06 ENCOUNTER — APPOINTMENT (OUTPATIENT)
Dept: NEUROSURGERY | Facility: CLINIC | Age: 48
End: 2018-09-06
Payer: COMMERCIAL

## 2018-09-06 VITALS
HEART RATE: 72 BPM | RESPIRATION RATE: 16 BRPM | BODY MASS INDEX: 22.22 KG/M2 | DIASTOLIC BLOOD PRESSURE: 73 MMHG | HEIGHT: 69 IN | SYSTOLIC BLOOD PRESSURE: 118 MMHG | OXYGEN SATURATION: 98 % | TEMPERATURE: 98.3 F | WEIGHT: 150 LBS

## 2018-09-06 PROCEDURE — 99214 OFFICE O/P EST MOD 30 MIN: CPT

## 2018-09-07 ENCOUNTER — OTHER (OUTPATIENT)
Age: 48
End: 2018-09-07

## 2018-09-12 ENCOUNTER — APPOINTMENT (OUTPATIENT)
Dept: OPHTHALMOLOGY | Facility: CLINIC | Age: 48
End: 2018-09-12
Payer: COMMERCIAL

## 2018-09-12 PROCEDURE — 92012 INTRM OPH EXAM EST PATIENT: CPT

## 2018-09-12 PROCEDURE — 92083 EXTENDED VISUAL FIELD XM: CPT

## 2018-09-13 ENCOUNTER — MEDICATION RENEWAL (OUTPATIENT)
Age: 48
End: 2018-09-13

## 2018-09-20 ENCOUNTER — APPOINTMENT (OUTPATIENT)
Dept: INTERNAL MEDICINE | Facility: CLINIC | Age: 48
End: 2018-09-20
Payer: COMMERCIAL

## 2018-09-20 VITALS
WEIGHT: 144 LBS | DIASTOLIC BLOOD PRESSURE: 71 MMHG | HEIGHT: 69 IN | BODY MASS INDEX: 21.33 KG/M2 | HEART RATE: 73 BPM | TEMPERATURE: 98.5 F | SYSTOLIC BLOOD PRESSURE: 112 MMHG | OXYGEN SATURATION: 97 % | RESPIRATION RATE: 12 BRPM

## 2018-09-20 DIAGNOSIS — E34.9 ENDOCRINE DISORDER, UNSPECIFIED: ICD-10-CM

## 2018-09-20 DIAGNOSIS — Z78.9 OTHER SPECIFIED HEALTH STATUS: ICD-10-CM

## 2018-09-20 DIAGNOSIS — J16.0 CHLAMYDIAL PNEUMONIA: ICD-10-CM

## 2018-09-20 DIAGNOSIS — J18.9 PNEUMONIA, UNSPECIFIED ORGANISM: ICD-10-CM

## 2018-09-20 DIAGNOSIS — R93.0 ABNORMAL FINDINGS ON DIAGNOSTIC IMAGING OF SKULL AND HEAD, NOT ELSEWHERE CLASSIFIED: ICD-10-CM

## 2018-09-20 DIAGNOSIS — R94.31 ABNORMAL ELECTROCARDIOGRAM [ECG] [EKG]: ICD-10-CM

## 2018-09-20 DIAGNOSIS — Z87.898 PERSONAL HISTORY OF OTHER SPECIFIED CONDITIONS: ICD-10-CM

## 2018-09-20 DIAGNOSIS — F51.04 PSYCHOPHYSIOLOGIC INSOMNIA: ICD-10-CM

## 2018-09-20 DIAGNOSIS — G47.9 SLEEP DISORDER, UNSPECIFIED: ICD-10-CM
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
## 2018-09-20 PROCEDURE — 99213 OFFICE O/P EST LOW 20 MIN: CPT

## 2018-09-20 PROCEDURE — 99396 PREV VISIT EST AGE 40-64: CPT

## 2018-09-22 PROBLEM — J18.9 CAP (COMMUNITY ACQUIRED PNEUMONIA): Status: RESOLVED | Noted: 2018-09-20 | Resolved: 2018-09-22

## 2018-09-22 LAB
25(OH)D3 SERPL-MCNC: 34.5 NG/ML
A PHAGOCYTOPH IGG TITR SER IF: NORMAL TITER
ALBUMIN SERPL ELPH-MCNC: 4.5 G/DL
ALP BLD-CCNC: 40 U/L
ALT SERPL-CCNC: 23 U/L
ANA SER IF-ACNC: NEGATIVE
ANION GAP SERPL CALC-SCNC: 13 MMOL/L
AST SERPL-CCNC: 22 U/L
B BURGDOR AB SER QL IA: NEGATIVE
B MICROTI IGG TITR SER: NORMAL TITER
BASOPHILS # BLD AUTO: 0.04 K/UL
BASOPHILS NFR BLD AUTO: 0.7 %
BILIRUB SERPL-MCNC: 0.5 MG/DL
BUN SERPL-MCNC: 19 MG/DL
CALCIUM SERPL-MCNC: 9.4 MG/DL
CHLORIDE SERPL-SCNC: 100 MMOL/L
CHOLEST SERPL-MCNC: 182 MG/DL
CHOLEST/HDLC SERPL: 2.6 RATIO
CK SERPL-CCNC: 68 U/L
CO2 SERPL-SCNC: 28 MMOL/L
CREAT SERPL-MCNC: 0.97 MG/DL
CRP SERPL-MCNC: <0.1 MG/DL
E CHAFFEENSIS IGG TITR SER IF: NORMAL TITER
EBV DNA SERPL NAA+PROBE-ACNC: NOT DETECTED IU/ML
EBVPCR LOG: NOT DETECTED LOGIU/ML
EOSINOPHIL # BLD AUTO: 0.16 K/UL
EOSINOPHIL NFR BLD AUTO: 2.6 %
ERYTHROCYTE [SEDIMENTATION RATE] IN BLOOD BY WESTERGREN METHOD: 5 MM/HR
ESTIMATED AVERAGE GLUCOSE: 105 MG/DL
FOLATE SERPL-MCNC: 17.2 NG/ML
GLUCOSE SERPL-MCNC: 90 MG/DL
HBA1C MFR BLD HPLC: 5.3 %
HCT VFR BLD CALC: 44 %
HDLC SERPL-MCNC: 69 MG/DL
HGB BLD-MCNC: 14.2 G/DL
IMM GRANULOCYTES NFR BLD AUTO: 0 %
IRON SERPL-MCNC: 65 UG/DL
LDLC SERPL CALC-MCNC: 85 MG/DL
LYMPHOCYTES # BLD AUTO: 1.83 K/UL
LYMPHOCYTES NFR BLD AUTO: 30 %
MAN DIFF?: NORMAL
MCHC RBC-ENTMCNC: 29.6 PG
MCHC RBC-ENTMCNC: 32.3 GM/DL
MCV RBC AUTO: 91.9 FL
MONOCYTES # BLD AUTO: 0.56 K/UL
MONOCYTES NFR BLD AUTO: 9.2 %
NEUTROPHILS # BLD AUTO: 3.52 K/UL
NEUTROPHILS NFR BLD AUTO: 57.5 %
PLATELET # BLD AUTO: 221 K/UL
POTASSIUM SERPL-SCNC: 4.4 MMOL/L
PROT SERPL-MCNC: 6.9 G/DL
PSA SERPL-MCNC: 1.9 NG/ML
RBC # BLD: 4.79 M/UL
RBC # FLD: 13.8 %
RHEUMATOID FACT SER QL: <10 IU/ML
SODIUM SERPL-SCNC: 141 MMOL/L
TRIGL SERPL-MCNC: 141 MG/DL
TSH SERPL-ACNC: 0.77 UIU/ML
VIT B12 SERPL-MCNC: 1440 PG/ML
WBC # FLD AUTO: 6.11 K/UL

## 2018-09-22 NOTE — REVIEW OF SYSTEMS
[Fatigue] : fatigue [Vision Problems] : vision problems [Negative] : Heme/Lymph [Fever] : no fever [Chills] : no chills [Hot Flashes] : no hot flashes [Night Sweats] : no night sweats [Recent Change In Weight] : ~T no recent weight change [Discharge] : no discharge [Pain] : no pain [Redness] : no redness [Dryness] : no dryness [Itching] : no itching [Earache] : no earache [Hearing Loss] : no hearing loss [Nosebleeds] : no nosebleeds [Postnasal Drip] : no postnasal drip [Nasal Discharge] : no nasal discharge [Sore Throat] : no sore throat [Hoarseness] : no hoarseness [FreeTextEntry4] : tinnitus

## 2018-09-22 NOTE — HISTORY OF PRESENT ILLNESS
[de-identified] : 48 yr old male w/DAVF s/p repair, with chronic Dec 2013, developed neurologic sx after 4 days on cipro for prostatitis. Left side numbness, left side tinnitus, insomnia and visual floaters persisting.  Has documented hearing loss pre- and post- cipro exposure. Ultimately never had prostatitis. Has been worked up by Neuro, Cardio, ID = has been worked up for CVA, had MRI that was negative. 2D echo was neg, stress test neg. Neg sleep study. Lyme's panel negative / borderline. Pt completed an extended course of IV abx tx for Lymes = sx didn't improve, but titers cleared.  Felt gradual improvement of various aches and pains and eyesight improvement. Remains w/left side tinnitus and fragmented sleep.

## 2018-09-22 NOTE — HISTORY OF PRESENT ILLNESS
[de-identified] : 48 yr old male w/DAVF s/p repair, with chronic Dec 2013, developed neurologic sx after 4 days on cipro for prostatitis. Left side numbness, left side tinnitus, insomnia and visual floaters persisting.  Has documented hearing loss pre- and post- cipro exposure. Ultimately never had prostatitis. Has been worked up by Neuro, Cardio, ID = has been worked up for CVA, had MRI that was negative. 2D echo was neg, stress test neg. Neg sleep study. Lyme's panel negative / borderline. Pt completed an extended course of IV abx tx for Lymes = sx didn't improve, but titers cleared.  Felt gradual improvement of various aches and pains and eyesight improvement. Remains w/left side tinnitus and fragmented sleep.

## 2018-09-22 NOTE — PHYSICAL EXAM
[No Acute Distress] : no acute distress [Well Nourished] : well nourished [Well Developed] : well developed [Well-Appearing] : well-appearing [Normal Sclera/Conjunctiva] : normal sclera/conjunctiva [PERRL] : pupils equal round and reactive to light [EOMI] : extraocular movements intact [Normal Outer Ear/Nose] : the outer ears and nose were normal in appearance [Normal Oropharynx] : the oropharynx was normal [No JVD] : no jugular venous distention [Supple] : supple [No Lymphadenopathy] : no lymphadenopathy [Thyroid Normal, No Nodules] : the thyroid was normal and there were no nodules present [No Respiratory Distress] : no respiratory distress  [Clear to Auscultation] : lungs were clear to auscultation bilaterally [No Accessory Muscle Use] : no accessory muscle use [Normal Rate] : normal rate  [Regular Rhythm] : with a regular rhythm [Normal S1, S2] : normal S1 and S2 [No Murmur] : no murmur heard [No Carotid Bruits] : no carotid bruits [No Edema] : there was no peripheral edema [Soft] : abdomen soft [Non Tender] : non-tender [Non-distended] : non-distended [No Masses] : no abdominal mass palpated [No HSM] : no HSM [Normal Bowel Sounds] : normal bowel sounds [Urethral Meatus] : meatus normal [Urinary Bladder Findings] : the bladder was normal on palpation [Scrotum] : the scrotum was normal [Testes Mass (___cm)] : there were no testicular masses [Normal Supraclavicular Nodes] : no supraclavicular lymphadenopathy [Normal Posterior Cervical Nodes] : no posterior cervical lymphadenopathy [Normal Anterior Cervical Nodes] : no anterior cervical lymphadenopathy [No CVA Tenderness] : no CVA  tenderness [No Spinal Tenderness] : no spinal tenderness [No Joint Swelling] : no joint swelling [Grossly Normal Strength/Tone] : grossly normal strength/tone [No Rash] : no rash [No Skin Lesions] : no skin lesions [Normal Gait] : normal gait [Coordination Grossly Intact] : coordination grossly intact [No Focal Deficits] : no focal deficits [Deep Tendon Reflexes (DTR)] : deep tendon reflexes were 2+ and symmetric [Speech Grossly Normal] : speech grossly normal [Memory Grossly Normal] : memory grossly normal [Normal Affect] : the affect was normal [Alert and Oriented x3] : oriented to person, place, and time [Normal Mood] : the mood was normal [Normal Insight/Judgement] : insight and judgment were intact

## 2018-09-22 NOTE — ASSESSMENT
[FreeTextEntry1] : 48 yr old w/DAVF s/p embolization, and chronic tinnitus, insomnia and paresthesias possibly triggered by Cipro, hx of tick bite s/p extensive tx for Lymes, presents today for yearly CPE, and f/u of ongoing issues.  PE is WNL except as stated.  Labs ordered.  Reassurance given RE: ongoing sx - discussed OTC strategies.  Pt to [facility] Neuro for further mgmt of paresthesias.  Visual floaters = plan as per Ophtho.  Counseled patient for greater than 50% of this visit, including diagnoses information, medication usage and side effects, therapeutic goals and options, and followup. Patient's questions were answered. Patient expressed understanding of, and agreement with, assessment and plan.\par \flor Sinclair MD

## 2018-09-22 NOTE — HISTORY OF PRESENT ILLNESS
[de-identified] : 48 yr old male w/DAVF s/p repair, with chronic Dec 2013, developed neurologic sx after 4 days on cipro for prostatitis. Left side numbness, left side tinnitus, insomnia and visual floaters persisting.  Has documented hearing loss pre- and post- cipro exposure. Ultimately never had prostatitis. Has been worked up by Neuro, Cardio, ID = has been worked up for CVA, had MRI that was negative. 2D echo was neg, stress test neg. Neg sleep study. Lyme's panel negative / borderline. Pt completed an extended course of IV abx tx for Lymes = sx didn't improve, but titers cleared.  Felt gradual improvement of various aches and pains and eyesight improvement. Remains w/left side tinnitus and fragmented sleep.

## 2018-09-24 LAB
APPEARANCE: CLEAR
BILIRUBIN URINE: NEGATIVE
BLOOD URINE: NEGATIVE
COLOR: YELLOW
GLUCOSE QUALITATIVE U: NEGATIVE MG/DL
KETONES URINE: NEGATIVE
LEUKOCYTE ESTERASE URINE: NEGATIVE
NITRITE URINE: NEGATIVE
PH URINE: 5.5
PROTEIN URINE: NEGATIVE MG/DL
SPECIFIC GRAVITY URINE: 1.01
UROBILINOGEN URINE: NEGATIVE MG/DL

## 2018-09-25 LAB

## 2018-09-29 ENCOUNTER — TRANSCRIPTION ENCOUNTER (OUTPATIENT)
Age: 48
End: 2018-09-29

## 2018-10-02 ENCOUNTER — APPOINTMENT (OUTPATIENT)
Dept: MRI IMAGING | Facility: CLINIC | Age: 48
End: 2018-10-02
Payer: COMMERCIAL

## 2018-10-02 ENCOUNTER — OUTPATIENT (OUTPATIENT)
Dept: OUTPATIENT SERVICES | Facility: HOSPITAL | Age: 48
LOS: 1 days | End: 2018-10-02
Payer: COMMERCIAL

## 2018-10-02 DIAGNOSIS — Z98.890 OTHER SPECIFIED POSTPROCEDURAL STATES: Chronic | ICD-10-CM

## 2018-10-02 DIAGNOSIS — I67.1 CEREBRAL ANEURYSM, NONRUPTURED: ICD-10-CM

## 2018-10-02 PROCEDURE — 70551 MRI BRAIN STEM W/O DYE: CPT

## 2018-10-02 PROCEDURE — 70546 MR ANGIOGRAPH HEAD W/O&W/DYE: CPT | Mod: 26,59

## 2018-10-02 PROCEDURE — A9585: CPT

## 2018-10-02 PROCEDURE — 70546 MR ANGIOGRAPH HEAD W/O&W/DYE: CPT

## 2018-10-02 PROCEDURE — 70551 MRI BRAIN STEM W/O DYE: CPT | Mod: 26

## 2018-10-05 LAB
CMV IGM SERPL QL: 10.1 AU/ML
CMV IGM SERPL QL: NEGATIVE
TESTOST BND SERPL-MCNC: 6.4 PG/ML
TESTOST SERPL-MCNC: 277.8 NG/DL

## 2018-10-11 ENCOUNTER — OTHER (OUTPATIENT)
Age: 48
End: 2018-10-11

## 2018-10-11 ENCOUNTER — APPOINTMENT (OUTPATIENT)
Dept: NEUROSURGERY | Facility: CLINIC | Age: 48
End: 2018-10-11
Payer: COMMERCIAL

## 2018-10-11 VITALS
OXYGEN SATURATION: 97 % | DIASTOLIC BLOOD PRESSURE: 69 MMHG | HEIGHT: 69 IN | WEIGHT: 144 LBS | BODY MASS INDEX: 21.33 KG/M2 | TEMPERATURE: 98.4 F | SYSTOLIC BLOOD PRESSURE: 134 MMHG | HEART RATE: 63 BPM | RESPIRATION RATE: 18 BRPM

## 2018-10-11 DIAGNOSIS — I67.1 CEREBRAL ANEURYSM, NONRUPTURED: ICD-10-CM

## 2018-10-11 PROCEDURE — 99215 OFFICE O/P EST HI 40 MIN: CPT

## 2018-10-16 ENCOUNTER — APPOINTMENT (OUTPATIENT)
Dept: NEUROLOGY | Facility: CLINIC | Age: 48
End: 2018-10-16
Payer: COMMERCIAL

## 2018-10-16 VITALS
HEART RATE: 65 BPM | BODY MASS INDEX: 21.77 KG/M2 | WEIGHT: 147 LBS | SYSTOLIC BLOOD PRESSURE: 129 MMHG | DIASTOLIC BLOOD PRESSURE: 74 MMHG | HEIGHT: 69 IN

## 2018-10-16 PROCEDURE — 99214 OFFICE O/P EST MOD 30 MIN: CPT

## 2018-10-24 ENCOUNTER — APPOINTMENT (OUTPATIENT)
Dept: NEUROLOGY | Facility: CLINIC | Age: 48
End: 2018-10-24

## 2018-11-14 ENCOUNTER — APPOINTMENT (OUTPATIENT)
Dept: OPHTHALMOLOGY | Facility: CLINIC | Age: 48
End: 2018-11-14

## 2018-12-20 ENCOUNTER — APPOINTMENT (OUTPATIENT)
Dept: PULMONOLOGY | Facility: CLINIC | Age: 48
End: 2018-12-20
Payer: COMMERCIAL

## 2018-12-20 VITALS
SYSTOLIC BLOOD PRESSURE: 109 MMHG | OXYGEN SATURATION: 95 % | TEMPERATURE: 97.8 F | DIASTOLIC BLOOD PRESSURE: 70 MMHG | HEART RATE: 68 BPM | BODY MASS INDEX: 22.66 KG/M2 | WEIGHT: 153 LBS | RESPIRATION RATE: 16 BRPM | HEIGHT: 69 IN

## 2018-12-20 PROCEDURE — 99203 OFFICE O/P NEW LOW 30 MIN: CPT | Mod: GC

## 2018-12-20 RX ORDER — MELOXICAM 7.5 MG/1
7.5 TABLET ORAL
Qty: 30 | Refills: 1 | Status: DISCONTINUED | OUTPATIENT
Start: 2018-04-20 | End: 2018-12-20

## 2018-12-20 RX ORDER — GABAPENTIN 100 MG/1
100 CAPSULE ORAL TWICE DAILY
Qty: 60 | Refills: 0 | Status: DISCONTINUED | COMMUNITY
Start: 2018-08-29 | End: 2018-12-20

## 2018-12-20 RX ORDER — HYDROXYCHLOROQUINE SULFATE 200 MG/1
200 TABLET, FILM COATED ORAL
Qty: 60 | Refills: 0 | Status: DISCONTINUED | COMMUNITY
Start: 2018-06-01 | End: 2018-12-20

## 2018-12-20 RX ORDER — METHYLPREDNISOLONE 4 MG/1
4 TABLET ORAL
Qty: 1 | Refills: 0 | Status: DISCONTINUED | COMMUNITY
Start: 2018-09-06 | End: 2018-12-20

## 2018-12-20 RX ORDER — DIAZEPAM 2 MG/1
2 TABLET ORAL
Qty: 10 | Refills: 0 | Status: DISCONTINUED | COMMUNITY
Start: 2018-09-01 | End: 2018-12-20

## 2018-12-20 RX ORDER — ONDANSETRON 8 MG/1
8 TABLET, ORALLY DISINTEGRATING ORAL EVERY 8 HOURS
Qty: 45 | Refills: 0 | Status: DISCONTINUED | COMMUNITY
Start: 2018-08-28 | End: 2018-12-20

## 2018-12-20 RX ORDER — NORTRIPTYLINE HYDROCHLORIDE 10 MG/1
10 CAPSULE ORAL
Qty: 60 | Refills: 1 | Status: DISCONTINUED | COMMUNITY
Start: 2018-10-16 | End: 2018-12-20

## 2018-12-20 RX ORDER — OXYCODONE 5 MG/1
5 TABLET ORAL AT BEDTIME
Qty: 10 | Refills: 0 | Status: DISCONTINUED | COMMUNITY
Start: 2018-09-01 | End: 2018-12-20

## 2019-01-04 NOTE — ED PROVIDER NOTE - PSH
Status post colonoscopy  ' 2013   Negative Attending MD Woodard:   I personally have seen and examined this patient.  Physician assistant note reviewed and agree on plan of care and except where noted.  See HPI, PE, and MDM for details.       Attending MD Woodard:    Gen:  appears uncomfortable, oriented x 3  Neck: supple, no swelling, trachea midline, no midline C spine ttp, +ttp R trapezius  Resp: CTAB, breathing comfortably  Abd: soft, NT, ND  Extremities: extremities warm to the touch, no peripheral edema   Msk: no extremity deformities or bony tenderness, +ttp about R anterior shoulder, distally NV intact RUE  Pysch: appropriate affect    Neuro: moves all extremities spontaneously, no gross motor or sensory deficits

## 2019-01-10 ENCOUNTER — APPOINTMENT (OUTPATIENT)
Dept: INTERNAL MEDICINE | Facility: CLINIC | Age: 49
End: 2019-01-10
Payer: COMMERCIAL

## 2019-01-10 VITALS
SYSTOLIC BLOOD PRESSURE: 124 MMHG | OXYGEN SATURATION: 99 % | DIASTOLIC BLOOD PRESSURE: 70 MMHG | RESPIRATION RATE: 12 BRPM | TEMPERATURE: 97.9 F | HEIGHT: 69 IN | WEIGHT: 153 LBS | BODY MASS INDEX: 22.66 KG/M2 | HEART RATE: 68 BPM

## 2019-01-10 DIAGNOSIS — H93.19 TINNITUS, UNSPECIFIED EAR: ICD-10-CM

## 2019-01-10 PROCEDURE — 99214 OFFICE O/P EST MOD 30 MIN: CPT

## 2019-01-10 NOTE — ASSESSMENT
[FreeTextEntry1] : INITIAL VISIT OF 48 Y OLD MALE WITH MULTIPLE HEALTH CONCERNS;S/P DAVF REPAIR,CHRONIC TINNITUS,ARTHRALGIAS,VISION DISTURBANCE= F/U NEUROLOGY,NEUROSURGEON AND ID

## 2019-01-10 NOTE — HISTORY OF PRESENT ILLNESS
[de-identified] : PT COMES TO ESTABLISH CARE ,NEED REFERRAL TO F/U ID IN [place] WHO TREATED FRO LYME DISEASE 2018\par NEEDS TO F/U NEUROSURGEON AFTER DURAL ARTERIOVENOUS FISTULA EMBOLIZATION 4 M AGO OR SO\par STILL WITH ARTHRALGIAS AND  VISION DISTURBANCES.\par ALSO CONCERN ABOUT CHRONIC TINNITUS AND ? CHRONIC LYME SEQUELA

## 2019-01-25 DIAGNOSIS — M25.569 PAIN IN UNSPECIFIED KNEE: ICD-10-CM

## 2019-02-05 ENCOUNTER — FORM ENCOUNTER (OUTPATIENT)
Age: 49
End: 2019-02-05

## 2019-02-06 ENCOUNTER — OUTPATIENT (OUTPATIENT)
Dept: OUTPATIENT SERVICES | Facility: HOSPITAL | Age: 49
LOS: 1 days | End: 2019-02-06
Payer: COMMERCIAL

## 2019-02-06 ENCOUNTER — APPOINTMENT (OUTPATIENT)
Dept: MRI IMAGING | Facility: CLINIC | Age: 49
End: 2019-02-06
Payer: COMMERCIAL

## 2019-02-06 DIAGNOSIS — Z98.890 OTHER SPECIFIED POSTPROCEDURAL STATES: Chronic | ICD-10-CM

## 2019-02-06 DIAGNOSIS — Z00.8 ENCOUNTER FOR OTHER GENERAL EXAMINATION: ICD-10-CM

## 2019-02-06 PROCEDURE — 70553 MRI BRAIN STEM W/O & W/DYE: CPT | Mod: 26

## 2019-02-06 PROCEDURE — 70553 MRI BRAIN STEM W/O & W/DYE: CPT

## 2019-02-06 PROCEDURE — 70546 MR ANGIOGRAPH HEAD W/O&W/DYE: CPT

## 2019-02-06 PROCEDURE — A9585: CPT

## 2019-02-07 ENCOUNTER — FORM ENCOUNTER (OUTPATIENT)
Age: 49
End: 2019-02-07

## 2019-02-28 ENCOUNTER — APPOINTMENT (OUTPATIENT)
Dept: NEUROSURGERY | Facility: CLINIC | Age: 49
End: 2019-02-28
Payer: COMMERCIAL

## 2019-02-28 VITALS
BODY MASS INDEX: 22.66 KG/M2 | RESPIRATION RATE: 16 BRPM | HEIGHT: 69 IN | WEIGHT: 153 LBS | DIASTOLIC BLOOD PRESSURE: 74 MMHG | SYSTOLIC BLOOD PRESSURE: 144 MMHG | OXYGEN SATURATION: 93 % | HEART RATE: 62 BPM | TEMPERATURE: 98.2 F

## 2019-02-28 PROCEDURE — 99213 OFFICE O/P EST LOW 20 MIN: CPT

## 2019-03-01 NOTE — ED PROVIDER NOTE - CARDIOVASCULAR NEGATIVE STATEMENT, MLM
Per patient and confirmed in ortho note that patient to d/c coumadin. D/c from clinic   no chest pain and no edema.

## 2019-03-01 NOTE — PHYSICAL EXAM
[General Appearance - Alert] : alert [General Appearance - In No Acute Distress] : in no acute distress [Oriented To Time, Place, And Person] : oriented to person, place, and time [Person] : oriented to person [Place] : oriented to place [Time] : oriented to time [Cranial Nerves Optic (II)] : visual acuity intact bilaterally,  pupils equal round and reactive to light [Cranial Nerves Oculomotor (III)] : extraocular motion intact [Cranial Nerves Trigeminal (V)] : facial sensation intact symmetrically [Cranial Nerves Facial (VII)] : face symmetrical [Cranial Nerves Vestibulocochlear (VIII)] : hearing was intact bilaterally [Cranial Nerves Glossopharyngeal (IX)] : tongue and palate midline [Cranial Nerves Accessory (XI - Cranial And Spinal)] : head turning and shoulder shrug symmetric [Cranial Nerves Hypoglossal (XII)] : there was no tongue deviation with protrusion [] : no respiratory distress

## 2019-03-05 ENCOUNTER — APPOINTMENT (OUTPATIENT)
Dept: SLEEP CENTER | Facility: CLINIC | Age: 49
End: 2019-03-05

## 2019-03-06 NOTE — ASSESSMENT
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

## 2019-03-06 NOTE — REASON FOR VISIT
[Follow-Up: _____] : a [unfilled] follow-up visit [FreeTextEntry1] : Carlo Solano is a 48yr old male here to follow up after having recent imaging done. 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 Since his last outpatient visit he said he is not back at his baseline. He still his eyesight problems, they are not as bad as they were after the surgery they are slowly improving but they have not totally resolved. He was recently evaluated by Dr. Espinoza as well.

## 2019-03-06 NOTE — REVIEW OF SYSTEMS
[Eyesight Problems] : eyesight problems [Joint Pain] : joint pain [Negative] : Heme/Lymph [FreeTextEntry9] : torn right meniscus

## 2019-03-10 ENCOUNTER — TRANSCRIPTION ENCOUNTER (OUTPATIENT)
Age: 49
End: 2019-03-10

## 2019-03-13 NOTE — OCCUPATIONAL THERAPY INITIAL EVALUATION ADULT - RANGE OF MOTION EXAMINATION, UPPER EXTREMITY
[FreeTextEntry1] : pt is here for DM f/u \par Sin Drugs 
bilateral UE Active ROM was WFL  (within functional limits)

## 2019-03-14 ENCOUNTER — APPOINTMENT (OUTPATIENT)
Dept: MRI IMAGING | Facility: CLINIC | Age: 49
End: 2019-03-14
Payer: COMMERCIAL

## 2019-03-14 ENCOUNTER — OUTPATIENT (OUTPATIENT)
Dept: OUTPATIENT SERVICES | Facility: HOSPITAL | Age: 49
LOS: 1 days | End: 2019-03-14
Payer: COMMERCIAL

## 2019-03-14 DIAGNOSIS — Z00.8 ENCOUNTER FOR OTHER GENERAL EXAMINATION: ICD-10-CM

## 2019-03-14 DIAGNOSIS — Z98.890 OTHER SPECIFIED POSTPROCEDURAL STATES: Chronic | ICD-10-CM

## 2019-03-14 PROCEDURE — 73721 MRI JNT OF LWR EXTRE W/O DYE: CPT

## 2019-03-14 PROCEDURE — 73721 MRI JNT OF LWR EXTRE W/O DYE: CPT | Mod: 26,LT

## 2019-03-18 ENCOUNTER — RX RENEWAL (OUTPATIENT)
Age: 49
End: 2019-03-18

## 2019-03-18 RX ORDER — ESZOPICLONE 2 MG/1
2 TABLET, FILM COATED ORAL
Qty: 30 | Refills: 3 | Status: DISCONTINUED | COMMUNITY
Start: 2019-02-20 | End: 2019-03-18

## 2019-03-22 ENCOUNTER — APPOINTMENT (OUTPATIENT)
Dept: NEUROLOGY | Facility: CLINIC | Age: 49
End: 2019-03-22
Payer: MEDICAID

## 2019-03-22 PROCEDURE — 95930 VISUAL EP TEST CNS W/I&R: CPT | Mod: 26

## 2019-03-25 ENCOUNTER — TRANSCRIPTION ENCOUNTER (OUTPATIENT)
Age: 49
End: 2019-03-25

## 2019-03-26 ENCOUNTER — APPOINTMENT (OUTPATIENT)
Dept: OPHTHALMOLOGY | Facility: CLINIC | Age: 49
End: 2019-03-26
Payer: COMMERCIAL

## 2019-03-26 PROCEDURE — 92015 DETERMINE REFRACTIVE STATE: CPT

## 2019-03-26 PROCEDURE — 92083 EXTENDED VISUAL FIELD XM: CPT

## 2019-03-26 PROCEDURE — 92012 INTRM OPH EXAM EST PATIENT: CPT

## 2019-04-01 ENCOUNTER — APPOINTMENT (OUTPATIENT)
Dept: NEUROLOGY | Facility: CLINIC | Age: 49
End: 2019-04-01
Payer: COMMERCIAL

## 2019-04-01 VITALS
HEIGHT: 69 IN | DIASTOLIC BLOOD PRESSURE: 67 MMHG | HEART RATE: 53 BPM | BODY MASS INDEX: 21.77 KG/M2 | SYSTOLIC BLOOD PRESSURE: 113 MMHG | WEIGHT: 147 LBS

## 2019-04-01 DIAGNOSIS — F51.01 PRIMARY INSOMNIA: ICD-10-CM

## 2019-04-01 PROCEDURE — 99215 OFFICE O/P EST HI 40 MIN: CPT

## 2019-04-01 NOTE — DISCUSSION/SUMMARY
[FreeTextEntry1] : 1. Visual issues: will obtain extensive autoimmune workup including myasthenia \par \par 2. Send Ach receptors and other markers\par \par 3. Elevated CPK and muscle atrophy in the legs: will obtain EMG\par \par 4. Poor sleep: start rozerem 8mg \par \par 5. Follow up in 2-3 months

## 2019-04-01 NOTE — HISTORY OF PRESENT ILLNESS
[FreeTextEntry1] : Patient reports that since last visit he continues to report visual disturbances including poor focusing and double vision at times. He followed up with Dr. Espinoza and he did not find any abnormality and was given prescription glasses. \par He has not been able to sleep and feeling fatigued and he has been now getting more weakness and atrophy in his leg muscles. This has been since September and he had blood work done recently showing the CPK levels to be 570.\par He has continued tinnitus and and joint pains and eye pain.\par He completed his visual evoked potentials which were normal.

## 2019-04-01 NOTE — REASON FOR VISIT
[Follow-Up: _____] : a [unfilled] follow-up visit [FreeTextEntry1] : visual changes and muscle atrophy in legs

## 2019-04-01 NOTE — PHYSICAL EXAM
[General Appearance - Alert] : alert [Cranial Nerves Optic (II)] : visual acuity intact bilaterally,  visual fields full to confrontation, pupils equal round and reactive to light [Cranial Nerves Oculomotor (III)] : extraocular motion intact [Cranial Nerves Trigeminal (V)] : facial sensation intact symmetrically [Cranial Nerves Facial (VII)] : face symmetrical [Cranial Nerves Glossopharyngeal (IX)] : tongue and palate midline [Cranial Nerves Accessory (XI - Cranial And Spinal)] : head turning and shoulder shrug symmetric [Cranial Nerves Hypoglossal (XII)] : there was no tongue deviation with protrusion [Motor Tone] : muscle tone was normal in all four extremities [Motor Strength] : muscle strength was normal in all four extremities [Involuntary Movements] : no involuntary movements were seen [No Muscle Atrophy] : normal bulk in all four extremities [Motor Handedness Left-Handed] : the patient is left hand dominant [Paresis Pronator Drift Right-Sided] : no pronator drift on the right [Paresis Pronator Drift Left-Sided] : no pronator drift on the left [Sensation Tactile Decrease] : light touch was intact [Sensation Pain / Temperature Decrease] : pain and temperature was intact [Sensation Vibration Decrease] : vibration was intact [Proprioception] : proprioception was intact [Romberg's Sign] : Romberg's sign was negtive [Abnormal Walk] : normal gait [Balance] : balance was intact [Non-ambulatory] : Non-ambulatory [Limited Balance] : balance was intact [Past-pointing] : there was no past-pointing [Tremor] : no tremor present [Coordination - Dysmetria Impaired Finger-to-Nose Bilateral] : not present [3+] : Patella right 3+ [2+] : Ankle jerk left 2+ [Plantar Reflex Right Only] : normal on the right [Plantar Reflex Left Only] : normal on the left [FreeTextEntry5] : decreased hearing on left side  [FreeTextEntry8] : Tandem gait was normal [] : no rash [Skin Lesions] : no skin lesions

## 2019-04-01 NOTE — REVIEW OF SYSTEMS
[Fever] : no fever [Chills] : no chills [Feeling Tired] : feeling tired [Sleep Disturbances] : sleep disturbances [As Noted in HPI] : as noted in HPI [Abnormal Sensation] : no abnormal sensation [Dizziness] : no dizziness [Lightheadedness] : no lightheadedness [Difficulty Walking] : no difficulty walking [Joint Pain] : joint pain [Joint Stiffness] : joint stiffness [Negative] : Heme/Lymph

## 2019-04-03 LAB — CK SERPL-CCNC: 1736 U/L

## 2019-04-04 ENCOUNTER — APPOINTMENT (OUTPATIENT)
Dept: INTERNAL MEDICINE | Facility: CLINIC | Age: 49
End: 2019-04-04
Payer: COMMERCIAL

## 2019-04-04 VITALS
HEART RATE: 71 BPM | HEIGHT: 69 IN | DIASTOLIC BLOOD PRESSURE: 75 MMHG | OXYGEN SATURATION: 99 % | SYSTOLIC BLOOD PRESSURE: 137 MMHG | BODY MASS INDEX: 22.22 KG/M2 | RESPIRATION RATE: 14 BRPM | TEMPERATURE: 97.4 F | WEIGHT: 150 LBS

## 2019-04-04 LAB — THYROPEROXIDASE AB SERPL IA-ACNC: <10 IU/ML

## 2019-04-04 PROCEDURE — 99213 OFFICE O/P EST LOW 20 MIN: CPT

## 2019-04-04 RX ORDER — RAMELTEON 8 MG/1
8 TABLET, FILM COATED ORAL
Qty: 30 | Refills: 1 | Status: DISCONTINUED | COMMUNITY
Start: 2019-04-01 | End: 2019-04-04

## 2019-04-04 RX ORDER — ARTEMETHER AND LUMEFANTRINE 20; 120 MG/1; MG/1
20-120 TABLET ORAL
Qty: 48 | Refills: 0 | Status: DISCONTINUED | COMMUNITY
Start: 2017-07-26 | End: 2019-04-04

## 2019-04-04 NOTE — HISTORY OF PRESENT ILLNESS
[FreeTextEntry8] : PT WAS SEEN BY NEUROLOGY AND HAD CPK LEVEL AT 1700 FROM 500 1/19 BY ID\par PT HAS BEEN HAVING PERSISTENT VISION SX,MUSCLE FATIGUE AND WEAKNESS AND NOT SURE WHY

## 2019-04-04 NOTE — ASSESSMENT
[FreeTextEntry1] : ACUTE VISIT OF 48 Y OLD MALE WHO PRESENTS WITH ELEVATED CPK WITH NO TRIGGER OR CAUSE ,ON NO MEDS ,ONLY ON SLEEPING AID FOR 2 Y= LABS ORDERED,REFER TO SEE RHEUMATOLOGIST AND INCREASE HYDRATION RECOMM

## 2019-04-04 NOTE — REVIEW OF SYSTEMS
[Fatigue] : fatigue [Vision Problems] : vision problems [Unsteady Walk] : ataxia [Insomnia] : insomnia [Negative] : Heme/Lymph [FreeTextEntry4] : TINNITUS

## 2019-04-04 NOTE — PHYSICAL EXAM
[No Acute Distress] : no acute distress [Normal Sclera/Conjunctiva] : normal sclera/conjunctiva [Normal Outer Ear/Nose] : the outer ears and nose were normal in appearance [Normal Oropharynx] : the oropharynx was normal [Normal TMs] : both tympanic membranes were normal [Normal Nasal Mucosa] : the nasal mucosa was normal [No JVD] : no jugular venous distention [Supple] : supple [No Respiratory Distress] : no respiratory distress  [Normal Rate] : normal rate  [No Edema] : there was no peripheral edema [Normal Mental Status] : the patient's orientation, memory, attention, language and fund of knowledge were normal [Anxious] : anxious [Impaired judgment] : intact judgment [Impaired Insight] : intact insight

## 2019-04-05 LAB
25(OH)D3 SERPL-MCNC: 31.7 NG/ML
ALBUMIN SERPL ELPH-MCNC: 4.9 G/DL
ALP BLD-CCNC: 45 U/L
ALT SERPL-CCNC: 19 U/L
ANION GAP SERPL CALC-SCNC: 15 MMOL/L
APPEARANCE: CLEAR
AST SERPL-CCNC: 62 U/L
BASOPHILS # BLD AUTO: 0.06 K/UL
BASOPHILS NFR BLD AUTO: 1.1 %
BILIRUB SERPL-MCNC: 0.4 MG/DL
BILIRUBIN URINE: NEGATIVE
BLOOD URINE: NEGATIVE
BUN SERPL-MCNC: 15 MG/DL
CALCIUM SERPL-MCNC: 9.6 MG/DL
CHLORIDE SERPL-SCNC: 101 MMOL/L
CHOLEST SERPL-MCNC: 187 MG/DL
CHOLEST/HDLC SERPL: 2.9 RATIO
CK SERPL-CCNC: 1399 U/L
CO2 SERPL-SCNC: 27 MMOL/L
COLOR: YELLOW
CREAT SERPL-MCNC: 1.03 MG/DL
EOSINOPHIL # BLD AUTO: 0.27 K/UL
EOSINOPHIL NFR BLD AUTO: 5.2 %
GLUCOSE QUALITATIVE U: NEGATIVE
GLUCOSE SERPL-MCNC: 49 MG/DL
HCT VFR BLD CALC: 46.7 %
HDLC SERPL-MCNC: 65 MG/DL
HGB BLD-MCNC: 15.4 G/DL
IMM GRANULOCYTES NFR BLD AUTO: 0.2 %
KETONES URINE: NEGATIVE
LDLC SERPL CALC-MCNC: 84 MG/DL
LEUKOCYTE ESTERASE URINE: NEGATIVE
LYMPHOCYTES # BLD AUTO: 1.66 K/UL
LYMPHOCYTES NFR BLD AUTO: 31.7 %
MAN DIFF?: NORMAL
MCHC RBC-ENTMCNC: 29.9 PG
MCHC RBC-ENTMCNC: 33 GM/DL
MCV RBC AUTO: 90.7 FL
MONOCYTES # BLD AUTO: 0.46 K/UL
MONOCYTES NFR BLD AUTO: 8.8 %
NEUTROPHILS # BLD AUTO: 2.78 K/UL
NEUTROPHILS NFR BLD AUTO: 53 %
NITRITE URINE: NEGATIVE
PH URINE: 6.5
PLATELET # BLD AUTO: 240 K/UL
POTASSIUM SERPL-SCNC: 4.5 MMOL/L
PROT SERPL-MCNC: 7.5 G/DL
PROTEIN URINE: NORMAL
PSA FREE FLD-MCNC: 15 %
PSA FREE SERPL-MCNC: 0.23 NG/ML
PSA SERPL-MCNC: 1.53 NG/ML
RBC # BLD: 5.15 M/UL
RBC # FLD: 12.4 %
RPR SER-TITR: NORMAL
SODIUM SERPL-SCNC: 143 MMOL/L
SPECIFIC GRAVITY URINE: 1.02
TRIGL SERPL-MCNC: 192 MG/DL
TSH SERPL-ACNC: 0.97 UIU/ML
UROBILINOGEN URINE: NORMAL
VIT B12 SERPL-MCNC: 1065 PG/ML
WBC # FLD AUTO: 5.24 K/UL

## 2019-04-08 ENCOUNTER — TRANSCRIPTION ENCOUNTER (OUTPATIENT)
Age: 49
End: 2019-04-08

## 2019-04-08 LAB
ACHR BIND AB SER-ACNC: <0.3 NMOL/L
ACHR BLOCK AB SER QL: <15
TESTOST BND SERPL-MCNC: 6.3 PG/ML
TESTOST SERPL-MCNC: 546 NG/DL

## 2019-04-09 ENCOUNTER — EMERGENCY (EMERGENCY)
Facility: HOSPITAL | Age: 49
LOS: 1 days | Discharge: HOME | End: 2019-04-09
Attending: EMERGENCY MEDICINE
Payer: COMMERCIAL

## 2019-04-09 VITALS
TEMPERATURE: 98 F | WEIGHT: 147.93 LBS | HEIGHT: 69 IN | DIASTOLIC BLOOD PRESSURE: 77 MMHG | HEART RATE: 72 BPM | SYSTOLIC BLOOD PRESSURE: 121 MMHG | RESPIRATION RATE: 18 BRPM | OXYGEN SATURATION: 96 %

## 2019-04-09 VITALS
DIASTOLIC BLOOD PRESSURE: 70 MMHG | OXYGEN SATURATION: 98 % | SYSTOLIC BLOOD PRESSURE: 113 MMHG | RESPIRATION RATE: 20 BRPM | HEART RATE: 65 BPM | TEMPERATURE: 98 F

## 2019-04-09 DIAGNOSIS — Z98.890 OTHER SPECIFIED POSTPROCEDURAL STATES: Chronic | ICD-10-CM

## 2019-04-09 LAB
ACHR MOD AB SER-ACNC: 12
ACRM BINDING ANTIBODY: 0 NMOL/L
ALBUMIN SERPL ELPH-MCNC: 4.7 G/DL — SIGNIFICANT CHANGE UP (ref 3.3–5)
ALP SERPL-CCNC: 40 U/L — SIGNIFICANT CHANGE UP (ref 40–120)
ALT FLD-CCNC: 21 U/L — SIGNIFICANT CHANGE UP (ref 10–45)
ANION GAP SERPL CALC-SCNC: 13 MMOL/L — SIGNIFICANT CHANGE UP (ref 5–17)
APTT BLD: 32.5 SEC — SIGNIFICANT CHANGE UP (ref 27.5–36.3)
AST SERPL-CCNC: 28 U/L — SIGNIFICANT CHANGE UP (ref 10–40)
BASOPHILS # BLD AUTO: 0.1 K/UL — SIGNIFICANT CHANGE UP (ref 0–0.2)
BASOPHILS NFR BLD AUTO: 1.3 % — SIGNIFICANT CHANGE UP (ref 0–2)
BILIRUB SERPL-MCNC: 0.5 MG/DL — SIGNIFICANT CHANGE UP (ref 0.2–1.2)
BUN SERPL-MCNC: 17 MG/DL — SIGNIFICANT CHANGE UP (ref 7–23)
CALCIUM SERPL-MCNC: 9.4 MG/DL — SIGNIFICANT CHANGE UP (ref 8.4–10.5)
CHLORIDE SERPL-SCNC: 103 MMOL/L — SIGNIFICANT CHANGE UP (ref 96–108)
CK SERPL-CCNC: 139 U/L — SIGNIFICANT CHANGE UP (ref 30–200)
CO2 SERPL-SCNC: 26 MMOL/L — SIGNIFICANT CHANGE UP (ref 22–31)
CREAT SERPL-MCNC: 0.95 MG/DL — SIGNIFICANT CHANGE UP (ref 0.5–1.3)
EOSINOPHIL # BLD AUTO: 0.2 K/UL — SIGNIFICANT CHANGE UP (ref 0–0.5)
EOSINOPHIL NFR BLD AUTO: 4.7 % — SIGNIFICANT CHANGE UP (ref 0–6)
ERYTHROCYTE [SEDIMENTATION RATE] IN BLOOD: 4 MM/HR — SIGNIFICANT CHANGE UP (ref 0–15)
GAS PNL BLDV: SIGNIFICANT CHANGE UP
GLUCOSE SERPL-MCNC: 82 MG/DL — SIGNIFICANT CHANGE UP (ref 70–99)
HCT VFR BLD CALC: 44.9 % — SIGNIFICANT CHANGE UP (ref 39–50)
HGB BLD-MCNC: 15.6 G/DL — SIGNIFICANT CHANGE UP (ref 13–17)
INR BLD: 1.08 RATIO — SIGNIFICANT CHANGE UP (ref 0.88–1.16)
LYMPHOCYTES # BLD AUTO: 1.6 K/UL — SIGNIFICANT CHANGE UP (ref 1–3.3)
LYMPHOCYTES # BLD AUTO: 30.9 % — SIGNIFICANT CHANGE UP (ref 13–44)
MCHC RBC-ENTMCNC: 31.4 PG — SIGNIFICANT CHANGE UP (ref 27–34)
MCHC RBC-ENTMCNC: 34.7 GM/DL — SIGNIFICANT CHANGE UP (ref 32–36)
MCV RBC AUTO: 90.5 FL — SIGNIFICANT CHANGE UP (ref 80–100)
MONOCYTES # BLD AUTO: 0.4 K/UL — SIGNIFICANT CHANGE UP (ref 0–0.9)
MONOCYTES NFR BLD AUTO: 8.9 % — SIGNIFICANT CHANGE UP (ref 2–14)
NEUTROPHILS # BLD AUTO: 2.7 K/UL — SIGNIFICANT CHANGE UP (ref 1.8–7.4)
NEUTROPHILS NFR BLD AUTO: 54.3 % — SIGNIFICANT CHANGE UP (ref 43–77)
PLATELET # BLD AUTO: 225 K/UL — SIGNIFICANT CHANGE UP (ref 150–400)
POTASSIUM SERPL-MCNC: 4 MMOL/L — SIGNIFICANT CHANGE UP (ref 3.5–5.3)
POTASSIUM SERPL-SCNC: 4 MMOL/L — SIGNIFICANT CHANGE UP (ref 3.5–5.3)
PROT SERPL-MCNC: 7.2 G/DL — SIGNIFICANT CHANGE UP (ref 6–8.3)
PROTHROM AB SERPL-ACNC: 12.3 SEC — SIGNIFICANT CHANGE UP (ref 10–12.9)
RBC # BLD: 4.96 M/UL — SIGNIFICANT CHANGE UP (ref 4.2–5.8)
RBC # FLD: 11.6 % — SIGNIFICANT CHANGE UP (ref 10.3–14.5)
SODIUM SERPL-SCNC: 142 MMOL/L — SIGNIFICANT CHANGE UP (ref 135–145)
TROPONIN T, HIGH SENSITIVITY RESULT: <6 NG/L — SIGNIFICANT CHANGE UP (ref 0–51)
WBC # BLD: 5 K/UL — SIGNIFICANT CHANGE UP (ref 3.8–10.5)
WBC # FLD AUTO: 5 K/UL — SIGNIFICANT CHANGE UP (ref 3.8–10.5)

## 2019-04-09 PROCEDURE — 82550 ASSAY OF CK (CPK): CPT

## 2019-04-09 PROCEDURE — 71046 X-RAY EXAM CHEST 2 VIEWS: CPT | Mod: 26

## 2019-04-09 PROCEDURE — 85027 COMPLETE CBC AUTOMATED: CPT

## 2019-04-09 PROCEDURE — 82803 BLOOD GASES ANY COMBINATION: CPT

## 2019-04-09 PROCEDURE — 99285 EMERGENCY DEPT VISIT HI MDM: CPT

## 2019-04-09 PROCEDURE — 70496 CT ANGIOGRAPHY HEAD: CPT | Mod: 26

## 2019-04-09 PROCEDURE — 70498 CT ANGIOGRAPHY NECK: CPT | Mod: 26

## 2019-04-09 PROCEDURE — 70496 CT ANGIOGRAPHY HEAD: CPT

## 2019-04-09 PROCEDURE — 83605 ASSAY OF LACTIC ACID: CPT

## 2019-04-09 PROCEDURE — 82947 ASSAY GLUCOSE BLOOD QUANT: CPT

## 2019-04-09 PROCEDURE — 70450 CT HEAD/BRAIN W/O DYE: CPT

## 2019-04-09 PROCEDURE — 80053 COMPREHEN METABOLIC PANEL: CPT

## 2019-04-09 PROCEDURE — 82435 ASSAY OF BLOOD CHLORIDE: CPT

## 2019-04-09 PROCEDURE — 85730 THROMBOPLASTIN TIME PARTIAL: CPT

## 2019-04-09 PROCEDURE — 84295 ASSAY OF SERUM SODIUM: CPT

## 2019-04-09 PROCEDURE — 82330 ASSAY OF CALCIUM: CPT

## 2019-04-09 PROCEDURE — 70498 CT ANGIOGRAPHY NECK: CPT

## 2019-04-09 PROCEDURE — 85014 HEMATOCRIT: CPT

## 2019-04-09 PROCEDURE — 99284 EMERGENCY DEPT VISIT MOD MDM: CPT

## 2019-04-09 PROCEDURE — 71046 X-RAY EXAM CHEST 2 VIEWS: CPT

## 2019-04-09 PROCEDURE — 84484 ASSAY OF TROPONIN QUANT: CPT

## 2019-04-09 PROCEDURE — 84132 ASSAY OF SERUM POTASSIUM: CPT

## 2019-04-09 PROCEDURE — 85652 RBC SED RATE AUTOMATED: CPT

## 2019-04-09 PROCEDURE — 85610 PROTHROMBIN TIME: CPT

## 2019-04-09 NOTE — ED PROVIDER NOTE - NSFOLLOWUPINSTRUCTIONS_ED_ALL_ED_FT
-Follow up with your neurologist Dr. Negro and your neurosurgeon Dr. Rojas within the week.   -Please return to the Emergency Department if you experience any new/worsening symptoms, including but are not limited to: unrelenting nausea, vomiting, fever, chills, chest pain, shortness of breath, dizziness, syncope, headache that does not resolve, persistent numbness or tingling, loss of sensation, loss of motor function, or any other concerning symptoms.

## 2019-04-09 NOTE — ED PROVIDER NOTE - ATTENDING CONTRIBUTION TO CARE
49 yo male hx of dural AVF embolized last year p/w numerous somatic complaints including transient L sided paresthesias and muscle spasms, now resolved, along with longstanding (constant x months) eye pressure, "discomfort" with EOM movement, and HA.  No focal weakness on exam.  Anxious affect.  Unclear etiology.  NSG recommends CTA head/neck and they will evaluate in the ED.

## 2019-04-09 NOTE — ED ADULT TRIAGE NOTE - CHIEF COMPLAINT QUOTE
Blurry vision, tingling in head, head pressure, s/p embolization for brain fistula last year, neg arm, drift, facial droop, or slurred speech

## 2019-04-09 NOTE — CONSULT NOTE ADULT - SUBJECTIVE AND OBJECTIVE BOX
p (1480)     HPI: Carlo Mckenzie, 47yo M with PMH of Lyme disease s/p treatment, dural AV fistula s/p embolization 8/2018 presents with numbness and weakness for 1 day. Pt states he was sleeping when he woke up and experienced left UE and left LE to the thigh numbness, tingling and weakness that has resolved since then. Associated with transient mid sternal chest pain that is dull, unsure duration, no radiation. States that his voice sounds different than usual and that he has been having intermittent blurry vision, tinnitus, head "tingling" for past few weeks. As per pt, recent CPK was 1700, he has been having migrating joint pain and swelling. Has been taking doxycyline and rifampin for symptoms with improvement. No fever, chills, SOB, N/V/D, syncope, lightheadedness, abdominal pain, dysuria, hematuria, hematochezia, melena, recent sick contacts, recent travel.    CTH CTA stable.     PAST MEDICAL HISTORY   Lyme disease  DAVF (dural arteriovenous fistula)  Hypotestosteronism    PAST SURGICAL HISTORY   Status post colonoscopy  No significant past surgical history    Avelox (Other)  Cipro (Other)  Levaquin (Other)      MEDICATIONS:  Antibiotics:    Neuro:    Anticoagulation:    Other:      SOCIAL HISTORY:   Occupation:   Marital Status:     FAMILY HISTORY:      REVIEW OF SYSTEMS:  Check here if all are normal other than Neurological/HPI [x]  General:  Eyes:  ENT:  Cardiac:  Respiratory:  GI:  Musculoskeletal:   Skin:  Neurologic:   Psychiatric:     PHYSICAL EXAMINATION:   T(C): 36.8 (04-09-19 @ 16:25), Max: 36.8 (04-09-19 @ 16:25)  HR: 65 (04-09-19 @ 16:25) (65 - 72)  BP: 113/70 (04-09-19 @ 16:25) (113/70 - 121/77)  RR: 20 (04-09-19 @ 16:25) (18 - 20)  SpO2: 98% (04-09-19 @ 16:25) (96% - 98%)  Wt(kg): --Height (cm): 175.26 (04-09 @ 13:42)  Weight (kg): 67.1 (04-09 @ 13:42)    General Examination:     Neurologic Examination:           AOx3, FC, PERRL, EOMI, no facial   5/5 throughout, no drift    LABS:                        15.6   5.0   )-----------( 225      ( 09 Apr 2019 15:15 )             44.9     04-09    142  |  103  |  17  ----------------------------<  82  4.0   |  26  |  0.95    Ca    9.4      09 Apr 2019 15:15    TPro  7.2  /  Alb  4.7  /  TBili  0.5  /  DBili  x   /  AST  28  /  ALT  21  /  AlkPhos  40  04-09    PT/INR - ( 09 Apr 2019 15:15 )   PT: 12.3 sec;   INR: 1.08 ratio         PTT - ( 09 Apr 2019 15:15 )  PTT:32.5 sec      RADIOLOGY & ADDITIONAL STUDIES:

## 2019-04-09 NOTE — ED PROVIDER NOTE - NS ED ROS FT
Caroline Overton MD:   General: denies fever, chills  HENT: denies nasal congestion, sore throat, rhinorrhea  Eyes: denies vision changes  CV: +chest pain  Resp: denies difficulty breathing, cough  Abdominal: denies nausea, vomiting, diarrhea, abdominal pain, blood in stool, dark stool  : denies pain with urination  MSK: denies recent trauma  Neuro: +headaches, +numbness, +tingling, dizziness, lightheadedness.  Skin: denies new rashes  Endocrine: denies recent weight loss

## 2019-04-09 NOTE — ED PROVIDER NOTE - OBJECTIVE STATEMENT
Caroline Overton MD: 47yo M with PMH of Lyme disease s/p treatment, dural AV fistula s/p embolization 8/2018 presents with numbness and weakness for 1 day. Pt states he was sleeping when he woke up and experienced left UE and left LE to the thigh numbness, tingling and weakness that has resolved since then. Associated with transient mid sternal chest pain that is dull, unsure duration, no radiation. States that his voice sounds different than usual and that he has been having . Caroline Overton MD: 49yo M with PMH of Lyme disease s/p treatment, dural AV fistula s/p embolization 8/2018 presents with numbness and weakness for 1 day. Pt states he was sleeping when he woke up and experienced left UE and left LE to the thigh numbness, tingling and weakness that has resolved since then. Associated with transient mid sternal chest pain that is dull, unsure duration, no radiation. States that his voice sounds different than usual and that he has been having intermittent blurry vision, tinnitus, head "tingling" for past few weeks. As per pt, recent CPK was 1700, he has been having migrating joint pain and swelling. Has been taking doxycyline and rifampin for symptoms with improvement. No fever, chills, SOB, N/V/D, syncope, lightheadedness, abdominal pain, dysuria, hematuria, hematochezia, melena, recent sick contacts, recent travel.

## 2019-04-09 NOTE — ED PROVIDER NOTE - PROGRESS NOTE DETAILS
Caroline Overton MD: Attending spoke to neurosurgery on the phone. Recommends CT head NC and CTA head and neck with IV contrast. Caroline Overton MD: Spoke to neurosurgery on phone who has evaluated pt at bedside. CT and CTA normal. Unlikely related to dural AV fistula procedure. No further recommendations, follow up with pt in outpt clinic. Caroline Overton MD: Spoke to neurosurgery on phone who has evaluated pt at bedside. CT and CTA normal. Unlikely related to dural AV fistula procedure. No further recommendations, follow up with pt in outpt clinic. Will d/c with follow up. Follow up instructions given, importance of follow up emphasized, return to ED parameters reviewed and patient verbalized understanding.  All questions answered, all concerns addressed.

## 2019-04-09 NOTE — ED PROVIDER NOTE - CLINICAL SUMMARY MEDICAL DECISION MAKING FREE TEXT BOX
Caroline Overton MD: 47yo M with PMH of Lyme disease s/p treatment, dural AV fistula s/p embolization 8/2018 presents with numbness and weakness for 1 day. Pt hemodynamically stable, afebrile. No FNDs on exam, pt is AAOx3. No meningismus. Concern for TIA vs intracranial hemorrhage vs MI vs Lyme disease vs other. Plan: labs, troponin, CXR, EKG, CT head, coags, CPK, ESR

## 2019-04-09 NOTE — ED ADULT NURSE NOTE - NSIMPLEMENTINTERV_GEN_ALL_ED
Implemented All Universal Safety Interventions:  [place] to call system. Call bell, personal items and telephone within reach. Instruct patient to call for assistance. Room bathroom lighting operational. Non-slip footwear when patient is off stretcher. Physically safe environment: no spills, clutter or unnecessary equipment. Stretcher in lowest position, wheels locked, appropriate side rails in place.

## 2019-04-09 NOTE — ED PROVIDER NOTE - PHYSICAL EXAMINATION
Caroline Overton MD:   CONSTITUTIONAL: Nontoxic, well nourished, well developed, middle-aged male, resting comfortably in no acute distress  HEAD: Normocephalic; atraumatic  EYES: Normal inspection, EOMI  ENMT: External appears normal; normal oropharynx  NECK: Supple; non-tender; no cervical lymphadenopathy  CARD: RRR; no audible murmurs, rubs, or gallops  RESP: No respiratory distress, lungs ctab/l  ABD: Soft, non-distended; non-tender; no rebound or guarding  EXT: No LE pitting edema or calf tenderness; distal pulses intact with good capillary refill  SKIN: Warm, dry, intact  NEURO: aaox3, CN II-IX intact except for decreased hearing in L ear, 5/5 strength b/l UE and LE, sensation intact in all extremities, no pronator drift, ambulates with a steady gait, finger to nose intact, no disdiadokinesia,  patellar reflexes WNL and symmetric b/l, Babinski with downgoing toes b/l.

## 2019-04-09 NOTE — CONSULT NOTE ADULT - ASSESSMENT
Carlo Mckenzie, 49yo M with PMH of Lyme disease s/p treatment, dural AV fistula s/p embolization 8/2018 presents with numbness and weakness for 1 day. Pt states he was sleeping when he woke up and experienced left UE and left LE to the thigh numbness, tingling and weakness that has resolved since then. Associated with transient mid sternal chest pain that is dull, unsure duration, no radiation. States that his voice sounds different than usual and that he has been having intermittent blurry vision, tinnitus, head "tingling" for past few weeks. As per pt, recent CPK was 1700, he has been having migrating joint pain and swelling. Has been taking doxycyline and rifampin for symptoms with improvement. No fever, chills, SOB, N/V/D, syncope, lightheadedness, abdominal pain, dysuria, hematuria, hematochezia, melena, recent sick contacts, recent travel.  CTH CTA stable.     -No acute neurosurgical intervention, may fu outpatient 1-2 weeks

## 2019-04-10 ENCOUNTER — APPOINTMENT (OUTPATIENT)
Dept: CARDIOLOGY | Facility: CLINIC | Age: 49
End: 2019-04-10
Payer: COMMERCIAL

## 2019-04-10 ENCOUNTER — NON-APPOINTMENT (OUTPATIENT)
Age: 49
End: 2019-04-10

## 2019-04-10 VITALS
RESPIRATION RATE: 12 BRPM | BODY MASS INDEX: 21.92 KG/M2 | DIASTOLIC BLOOD PRESSURE: 75 MMHG | OXYGEN SATURATION: 95 % | WEIGHT: 148 LBS | HEIGHT: 69 IN | HEART RATE: 66 BPM | SYSTOLIC BLOOD PRESSURE: 125 MMHG | TEMPERATURE: 97.7 F

## 2019-04-10 PROCEDURE — 99214 OFFICE O/P EST MOD 30 MIN: CPT | Mod: 25

## 2019-04-10 PROCEDURE — 93000 ELECTROCARDIOGRAM COMPLETE: CPT

## 2019-04-10 NOTE — HISTORY OF PRESENT ILLNESS
[FreeTextEntry1] : Patient is a 48 year old man with a history of dyslipidemia, Lyme disease, insomnia, rhythm disorder, mitral valve prolapse, and cerebrovascular AV fistula status post surgery reportedly complicated by ICH who presents today for evaluation left arm discomfort. He states that his left arm went 2 nights ago and he was unable to close his left hand that has since resolved. He mentions infrequent episodes of disturbance of his heart rhythm but otherwise denies any chest pain or dyspnea.

## 2019-04-10 NOTE — DISCUSSION/SUMMARY
[FreeTextEntry1] : IMPRESSION: Mr. YOON is a 48 year old man with a history of dyslipidemia, Lyme disease, insomnia, rhythm disorder, mitral valve prolapse, and cerebrovascular AV fistula status post surgery reportedly complicated by ICH who presents today for evaluation left arm discomfort. \par \par PLAN:\par 1. His left arm discomfort is unlikely secondary to a cardiac etiology. I have asked him to schedule an echocardiogram to evaluate his LV function and also given his history of mitral prolapse.\par 2. I have suggested a followup Holter monitor given his previous episodes of atrial tachycardia.\par 3. He will notify me should he have any episodes of chest pain or dyspnea at which point we can consider an ischemic evaluation. We will also consider further evaluation should there be any abnormalities on his echocardiogram.\par

## 2019-04-10 NOTE — PHYSICAL EXAM
[Normal Appearance] : normal appearance [General Appearance - Well Developed] : well developed [Well Groomed] : well groomed [General Appearance - Well Nourished] : well nourished [Normal Conjunctiva] : the conjunctiva exhibited no abnormalities [No Deformities] : no deformities [General Appearance - In No Acute Distress] : no acute distress [Normal Oral Mucosa] : normal oral mucosa [No Oral Cyanosis] : no oral cyanosis [No Oral Pallor] : no oral pallor [Normal Jugular Venous V Waves Present] : normal jugular venous V waves present [Normal Jugular Venous A Waves Present] : normal jugular venous A waves present [No Jugular Venous Kraft A Waves] : no jugular venous kraft A waves [Respiration, Rhythm And Depth] : normal respiratory rhythm and effort [Exaggerated Use Of Accessory Muscles For Inspiration] : no accessory muscle use [Auscultation Breath Sounds / Voice Sounds] : lungs were clear to auscultation bilaterally [Bowel Sounds] : normal bowel sounds [Abdomen Tenderness] : non-tender [Abnormal Walk] : normal gait [Abdomen Soft] : soft [Nail Clubbing] : no clubbing of the fingernails [Gait - Sufficient For Exercise Testing] : the gait was sufficient for exercise testing [Cyanosis, Localized] : no localized cyanosis [Petechial Hemorrhages (___cm)] : no petechial hemorrhages [] : no rash [Skin Color & Pigmentation] : normal skin color and pigmentation [Skin Lesions] : no skin lesions [No Skin Ulcers] : no skin ulcer [Oriented To Time, Place, And Person] : oriented to person, place, and time [Affect] : the affect was normal [Mood] : the mood was normal [No Anxiety] : not feeling anxious [Normal Rate] : normal [Rhythm Regular] : regular [Normal S2] : normal S2 [Normal S1] : normal S1 [No Murmur] : no murmurs heard [No Pitting Edema] : no pitting edema present [FreeTextEntry1] : Extraocular muscles intact. Anicteric sclerae. [S3] : no S3 [Right Carotid Bruit] : no bruit heard over the right carotid [Left Carotid Bruit] : no bruit heard over the left carotid [Bruit] : no bruit heard

## 2019-04-25 ENCOUNTER — APPOINTMENT (OUTPATIENT)
Dept: CARDIOLOGY | Facility: CLINIC | Age: 49
End: 2019-04-25
Payer: COMMERCIAL

## 2019-04-25 ENCOUNTER — APPOINTMENT (OUTPATIENT)
Dept: NEUROLOGY | Facility: CLINIC | Age: 49
End: 2019-04-25
Payer: COMMERCIAL

## 2019-04-25 VITALS
DIASTOLIC BLOOD PRESSURE: 69 MMHG | WEIGHT: 148 LBS | HEIGHT: 69 IN | BODY MASS INDEX: 21.92 KG/M2 | SYSTOLIC BLOOD PRESSURE: 120 MMHG | HEART RATE: 59 BPM

## 2019-04-25 DIAGNOSIS — R74.8 ABNORMAL LEVELS OF OTHER SERUM ENZYMES: ICD-10-CM

## 2019-04-25 PROCEDURE — 99214 OFFICE O/P EST MOD 30 MIN: CPT

## 2019-04-25 PROCEDURE — 93306 TTE W/DOPPLER COMPLETE: CPT

## 2019-04-25 NOTE — HISTORY OF PRESENT ILLNESS
[FreeTextEntry1] : Patient referred by Dr. Negro for evaluation of elevated CPK. \par \par Patient has complicated history, which starts in 2013 when he took cipro for suspected prostatitis and started having many sx's including eye floaters, numbness, joint pains and insomnia.  \par \par He has had tinnitus since 2014.  He also was diagnosed with right occipital DAVF which was embolized by Dr. Rojas last August 2018.  \par \par Had blood work done by ID in Jan and was 570, then repeated Apr 3 and 4 they were 1736 and 1399. Then was repeated on Apr 9, and was 139.  He denies summer weakness, but states that he always feels sore in 24-48 hours after a work out at the gym.  Has never noted dark urine.  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  Eats normal diet, not a vegetarian.  Has not been on any potentially myotoxic drugs since last fall.  Denies SOB, dysphagia, or ptosis\par \par FH negative for neuromuscular disease.

## 2019-04-25 NOTE — ASSESSMENT
[FreeTextEntry1] : Patient has a normal neurological examination and normal strength and muscle tone/bulk.  \par \par The history might suggest a metabolic myopathy but I think the elevated CPK's recently are spurious.  Will repeat with carnitine and lactate, and I have offered reassurance to him that I don't think he has a myopathic process.\par \par The elevated CPK's could have been transient from a virus, and they may simply have been drawn in close proximity to a work out at the gym.

## 2019-04-25 NOTE — PHYSICAL EXAM
[General Appearance - In No Acute Distress] : in no acute distress [General Appearance - Alert] : alert [Place] : oriented to place [Person] : oriented to person [Remote Intact] : remote memory intact [Time] : oriented to time [Short Term Intact] : short term memory intact [Registration Intact] : recent registration memory intact [Concentration Intact] : normal concentrating ability [Visual Intact] : visual attention was ~T not ~L decreased [Repeating Phrases] : no difficulty repeating a phrase [Naming Objects] : no difficulty naming common objects [Writing A Sentence] : no difficulty writing a sentence [Comprehension] : comprehension intact [Fluency] : fluency intact [Past History] : adequate knowledge of personal past history [Reading] : reading intact [Cranial Nerves Optic (II)] : visual acuity intact bilaterally,  visual fields full to confrontation, pupils equal round and reactive to light [Cranial Nerves Facial (VII)] : face symmetrical [Cranial Nerves Trigeminal (V)] : facial sensation intact symmetrically [Cranial Nerves Oculomotor (III)] : extraocular motion intact [Cranial Nerves Glossopharyngeal (IX)] : tongue and palate midline [Cranial Nerves Vestibulocochlear (VIII)] : hearing was intact bilaterally [Motor Tone] : muscle tone was normal in all four extremities [Cranial Nerves Accessory (XI - Cranial And Spinal)] : head turning and shoulder shrug symmetric [Cranial Nerves Hypoglossal (XII)] : there was no tongue deviation with protrusion [Motor Handedness Left-Handed] : the patient is left hand dominant [Motor Strength] : muscle strength was normal in all four extremities [No Muscle Atrophy] : normal bulk in all four extremities [Sensation Tactile Decrease] : light touch was intact [Proprioception] : proprioception was intact [Sensation Vibration Decrease] : vibration was intact [Past-pointing] : there was no past-pointing [Abnormal Walk] : normal gait [Balance] : balance was intact [Tremor] : no tremor present [2+] : Ankle jerk left 2+ [Plantar Reflex Right Only] : normal on the right [Plantar Reflex Left Only] : normal on the left [Neck Cervical Mass (___cm)] : no neck mass was observed [Neck Appearance] : the appearance of the neck was normal [Thyroid Diffuse Enlargement] : the thyroid was not enlarged [Thyroid Nodule] : there were no palpable thyroid nodules [Jugular Venous Distention Increased] : there was no jugular-venous distention [Auscultation Breath Sounds / Voice Sounds] : lungs were clear to auscultation bilaterally [] : no respiratory distress [Heart Rate And Rhythm] : heart rate was normal and rhythm regular [Heart Sounds Gallop] : no gallops [Heart Sounds] : normal S1 and S2 [Heart Sounds Pericardial Friction Rub] : no pericardial rub [Murmurs] : no murmurs

## 2019-04-28 LAB
CK SERPL-CCNC: 85 U/L
LACTATE BLDA-MCNC: 1.6 MMOL/L

## 2019-05-01 LAB
CARN ESTERS SERPL-MCNC: 8.3 UMOL/L
CARNITINE FREE SERPL-SCNC: 39.4 UMOL/L
CARNITINE FREE SFR SERPL: 0.2 UMOL/L
CARNITINE SERPL-SCNC: 47.7 UMOL/L

## 2019-05-03 LAB — ACYLCARNITINE SERPL-MCNC: NORMAL

## 2019-05-10 ENCOUNTER — TRANSCRIPTION ENCOUNTER (OUTPATIENT)
Age: 49
End: 2019-05-10

## 2019-05-13 ENCOUNTER — APPOINTMENT (OUTPATIENT)
Dept: NEUROSURGERY | Facility: CLINIC | Age: 49
End: 2019-05-13
Payer: MEDICAID

## 2019-05-13 PROCEDURE — 99214 OFFICE O/P EST MOD 30 MIN: CPT

## 2019-05-13 NOTE — PHYSICAL EXAM
[General Appearance - Alert] : alert [General Appearance - In No Acute Distress] : in no acute distress [Person] : oriented to person [Time] : oriented to time [Motor Strength] : muscle strength was normal in all four extremities [Place] : oriented to place [Involuntary Movements] : no involuntary movements were seen [] : no respiratory distress

## 2019-05-14 NOTE — REASON FOR VISIT
[Follow-Up: _____] : a [unfilled] follow-up visit [Family Member] : family member [FreeTextEntry1] : Carlo Solano is a 49yr old male here for a follow up visit. He continues to have a decrease in visual acuity, pain when he moves his eyes, and halos around lights at night. He feels his hearing in the left ear has decreased. 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

## 2019-05-14 NOTE — CONSULT LETTER
[Dear  ___] : Dear ~ESPERANZA, [Consult Letter:] : I had the pleasure of evaluating your patient, [unfilled]. [Consult Closing:] : Thank you very much for allowing me to participate in the care of this patient.  If you have any questions, please do not hesitate to contact me. [DrJose  ___] : Dr. SCHULZ [DrJose ___] : Dr. SCHULZ [___] : [unfilled] [FreeTextEntry2] : Benjie Earl [FreeTextEntry3] : \par Sincerely,\par \par King Rojas MD\par Professor of Neurological Surgery and Radiology\par  Department of Neurosurgery\par Director Cerebrovascular Surgery\par [employer] [facility]\par  [FreeTextEntry1] : As you know he is a 48 year old left handed male with a past medical history of Lyme disease who complained of right sided non-pulsatile tinnitis and floaters in his vision.  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  He underwent endovascular treatment of this lesion on 8/21/2018 resulting in complete occlusion of the lesion.  A repeat MRI brain demonstrates no recurrence of the fistula and no strokes. His decrease in visual acuity is unrelated to the sinus dural fistula.  I highly recommend repeat cerebral angiogram August 2019 to confirm durability of closure of the fistula. From a neurosurgical perspective there is no contraindication for him to proceed with IV antibiotics to treat lyme disease.

## 2019-05-14 NOTE — REVIEW OF SYSTEMS
[Eyesight Problems] : eyesight problems [Negative] : Heme/Lymph [Loss Of Hearing] : hearing loss [FreeTextEntry4] : whooshing sound

## 2019-05-14 NOTE — ASSESSMENT
[FreeTextEntry1] : Impression: 48yr old male s/o right transverse sinus dural AVF embolization with ryne 8/21/2018 \par \par Plan: \par The decrease in vision is unrelated to the sinus dural fistula \par NO evidence of stroke on MRI brain \par Reviewed Dr. Espinoza letter no neuro optho abnormalities\par Highly recommend repeat cerebral angiogram August 2019 to confirm durability of closure of the fistula \par No definitive association between lyme disease and sinus dural AVF\par Patient PICC line and treatment of lyme disease will not interfere with the cerebral angiogram\par \par \par

## 2019-05-15 RX ORDER — MELATONIN 3 MG
25 MCG TABLET ORAL
Qty: 90 | Refills: 0 | Status: ACTIVE | COMMUNITY
Start: 2019-05-15 | End: 1900-01-01

## 2019-05-16 ENCOUNTER — APPOINTMENT (OUTPATIENT)
Dept: INTERNAL MEDICINE | Facility: CLINIC | Age: 49
End: 2019-05-16

## 2019-05-24 DIAGNOSIS — A49.3 MYCOPLASMA INFECTION, UNSPECIFIED SITE: ICD-10-CM

## 2019-05-29 LAB
BASOPHILS # BLD AUTO: 0.05 K/UL
BASOPHILS NFR BLD AUTO: 1.1 %
EOSINOPHIL # BLD AUTO: 0.21 K/UL
EOSINOPHIL NFR BLD AUTO: 4.4 %
HCT VFR BLD CALC: 44 %
HGB BLD-MCNC: 14.6 G/DL
IMM GRANULOCYTES NFR BLD AUTO: 0.2 %
INR PPP: 1.07 RATIO
LYMPHOCYTES # BLD AUTO: 1.36 K/UL
LYMPHOCYTES NFR BLD AUTO: 28.8 %
MAN DIFF?: NORMAL
MCHC RBC-ENTMCNC: 30.1 PG
MCHC RBC-ENTMCNC: 33.2 GM/DL
MCV RBC AUTO: 90.7 FL
MONOCYTES # BLD AUTO: 0.44 K/UL
MONOCYTES NFR BLD AUTO: 9.3 %
NEUTROPHILS # BLD AUTO: 2.65 K/UL
NEUTROPHILS NFR BLD AUTO: 56.2 %
PLATELET # BLD AUTO: 206 K/UL
PT BLD: 12.1 SEC
RBC # BLD: 4.85 M/UL
RBC # FLD: 12.2 %
WBC # FLD AUTO: 4.72 K/UL

## 2019-05-30 ENCOUNTER — FORM ENCOUNTER (OUTPATIENT)
Age: 49
End: 2019-05-30

## 2019-05-31 ENCOUNTER — OUTPATIENT (OUTPATIENT)
Dept: OUTPATIENT SERVICES | Facility: HOSPITAL | Age: 49
LOS: 1 days | End: 2019-05-31
Payer: MEDICAID

## 2019-05-31 ENCOUNTER — MEDICATION RENEWAL (OUTPATIENT)
Age: 49
End: 2019-05-31

## 2019-05-31 DIAGNOSIS — A49.3 MYCOPLASMA INFECTION, UNSPECIFIED SITE: ICD-10-CM

## 2019-05-31 DIAGNOSIS — Z98.890 OTHER SPECIFIED POSTPROCEDURAL STATES: Chronic | ICD-10-CM

## 2019-05-31 PROCEDURE — C1751: CPT

## 2019-05-31 PROCEDURE — 36573 INSJ PICC RS&I 5 YR+: CPT

## 2019-06-04 DIAGNOSIS — A69.20 LYME DISEASE, UNSPECIFIED: ICD-10-CM

## 2019-06-04 DIAGNOSIS — Z45.2 ENCOUNTER FOR ADJUSTMENT AND MANAGEMENT OF VASCULAR ACCESS DEVICE: ICD-10-CM

## 2019-06-05 ENCOUNTER — APPOINTMENT (OUTPATIENT)
Dept: INFECTIOUS DISEASE | Facility: CLINIC | Age: 49
End: 2019-06-05

## 2019-06-25 ENCOUNTER — APPOINTMENT (OUTPATIENT)
Dept: INTERNAL MEDICINE | Facility: CLINIC | Age: 49
End: 2019-06-25
Payer: MEDICAID

## 2019-06-25 VITALS
BODY MASS INDEX: 21.62 KG/M2 | HEIGHT: 69 IN | DIASTOLIC BLOOD PRESSURE: 61 MMHG | WEIGHT: 146 LBS | TEMPERATURE: 99.3 F | HEART RATE: 93 BPM | OXYGEN SATURATION: 97 % | SYSTOLIC BLOOD PRESSURE: 112 MMHG | RESPIRATION RATE: 12 BRPM

## 2019-06-25 DIAGNOSIS — M54.5 LOW BACK PAIN: ICD-10-CM

## 2019-06-25 DIAGNOSIS — M62.838 OTHER MUSCLE SPASM: ICD-10-CM

## 2019-06-25 DIAGNOSIS — H93.A9 PULSATILE TINNITUS, UNSPECIFIED EAR: ICD-10-CM

## 2019-06-25 DIAGNOSIS — H53.9 UNSPECIFIED VISUAL DISTURBANCE: ICD-10-CM

## 2019-06-25 PROCEDURE — 99214 OFFICE O/P EST MOD 30 MIN: CPT

## 2019-06-25 RX ORDER — SUVOREXANT 20 MG/1
20 TABLET, FILM COATED ORAL
Qty: 30 | Refills: 3 | Status: DISCONTINUED | COMMUNITY
Start: 2019-03-18 | End: 2019-06-25

## 2019-06-25 NOTE — PHYSICAL EXAM
[Well Nourished] : well nourished [No Acute Distress] : no acute distress [Well-Appearing] : well-appearing [Well Developed] : well developed [Normal Sclera/Conjunctiva] : normal sclera/conjunctiva [PERRL] : pupils equal round and reactive to light [Normal Oropharynx] : the oropharynx was normal [Normal Outer Ear/Nose] : the outer ears and nose were normal in appearance [EOMI] : extraocular movements intact [No JVD] : no jugular venous distention [Supple] : supple [No Lymphadenopathy] : no lymphadenopathy [No Respiratory Distress] : no respiratory distress  [Thyroid Normal, No Nodules] : the thyroid was normal and there were no nodules present [No Accessory Muscle Use] : no accessory muscle use [Clear to Auscultation] : lungs were clear to auscultation bilaterally [Normal Rate] : normal rate  [Regular Rhythm] : with a regular rhythm [No Murmur] : no murmur heard [Normal S1, S2] : normal S1 and S2 [No Carotid Bruits] : no carotid bruits [Pedal Pulses Present] : the pedal pulses are present [No Varicosities] : no varicosities [No Abdominal Bruit] : a ~M bruit was not heard ~T in the abdomen [No Extremity Clubbing/Cyanosis] : no extremity clubbing/cyanosis [No Edema] : there was no peripheral edema [Soft] : abdomen soft [No Palpable Aorta] : no palpable aorta [Non Tender] : non-tender [Non-distended] : non-distended [No HSM] : no HSM [No Masses] : no abdominal mass palpated [Normal Posterior Cervical Nodes] : no posterior cervical lymphadenopathy [Normal Bowel Sounds] : normal bowel sounds [Normal Anterior Cervical Nodes] : no anterior cervical lymphadenopathy [No CVA Tenderness] : no CVA  tenderness [No Spinal Tenderness] : no spinal tenderness [No Joint Swelling] : no joint swelling [No Rash] : no rash [Grossly Normal Strength/Tone] : grossly normal strength/tone [Normal Gait] : normal gait [No Focal Deficits] : no focal deficits [Coordination Grossly Intact] : coordination grossly intact [Normal Affect] : the affect was normal [Deep Tendon Reflexes (DTR)] : deep tendon reflexes were 2+ and symmetric [Normal Insight/Judgement] : insight and judgment were intact [de-identified] : TENDER R LOWER RIB AT POSTERIOR AREA

## 2019-06-25 NOTE — REVIEW OF SYSTEMS
[Fatigue] : fatigue [Recent Change In Weight] : ~T recent weight change [Vision Problems] : vision problems [Joint Pain] : joint pain [Joint Stiffness] : joint stiffness [Headache] : headache [Insomnia] : insomnia [Negative] : Heme/Lymph [FreeTextEntry4] : TINNITUS

## 2019-06-25 NOTE — HISTORY OF PRESENT ILLNESS
[de-identified] : PT COMES FOR F/U ON 3ER DAY OF IV ROCEPHIN AND DOXY AS PER ID VIA PICC LINE  RUE\par START FEELING LESS ARTHRALGIAS BUT TINNITUS AND VISION DISTURBANCES PERSISTS\par WILL SEE DR HODGSON ,ID AND NEUROLOGY SOON\par CC OF R RIB MID BACK PAIN WHEN TAKES A DEEP BREATH OR TURNS HIS TRUNK FOR MONTHS\par DECREASED APPETITE BUT ABLE TO EAT WITH NO PAIN OR NAUSEA

## 2019-06-25 NOTE — ASSESSMENT
[FreeTextEntry1] : 49 Y OLD MALE WITH PRESUMPTIVE RECURRENCE OF LYME SX ON IV DOXY AND ROCEPHIN AS PER ID= F/U ID\par VISION AND NEUROLOGICAL SX= F/U OPHTHA AND NEUROLOGY\par RTO IN 3 M OR PRN

## 2019-07-01 ENCOUNTER — APPOINTMENT (OUTPATIENT)
Dept: NEUROLOGY | Facility: CLINIC | Age: 49
End: 2019-07-01
Payer: COMMERCIAL

## 2019-07-01 VITALS
HEART RATE: 64 BPM | SYSTOLIC BLOOD PRESSURE: 112 MMHG | WEIGHT: 138 LBS | HEIGHT: 69 IN | BODY MASS INDEX: 20.44 KG/M2 | DIASTOLIC BLOOD PRESSURE: 61 MMHG

## 2019-07-01 DIAGNOSIS — H53.9 UNSPECIFIED VISUAL DISTURBANCE: ICD-10-CM

## 2019-07-01 DIAGNOSIS — R29.90 UNSPECIFIED SYMPTOMS AND SIGNS INVOLVING THE NERVOUS SYSTEM: ICD-10-CM

## 2019-07-01 PROCEDURE — 99214 OFFICE O/P EST MOD 30 MIN: CPT

## 2019-07-01 NOTE — HISTORY OF PRESENT ILLNESS
[FreeTextEntry1] : Patient report that since last visit he has continued visual changes since April but it has stabilized. He sees double at times and his visual perception and when it is night time he sees starburts of lights.\par He has not yet found a vision therapist that accepts his insurance. \par He also has been having continued issues with insomnia and tinnitus and increased anxiety. \par He went to see a neurosurgeon and they recommended an EEG t rule out occipital seizures.

## 2019-07-01 NOTE — REVIEW OF SYSTEMS
[Fever] : no fever [Chills] : no chills [Feeling Tired] : not feeling tired [As Noted in HPI] : as noted in HPI [Eye Pain] : no eye pain [Red Eyes] : eyes not red [Negative] : Heme/Lymph

## 2019-07-01 NOTE — PHYSICAL EXAM
[General Appearance - Alert] : alert [Oriented To Time, Place, And Person] : oriented to person, place, and time [Cranial Nerves Optic (II)] : visual acuity intact bilaterally,  visual fields full to confrontation, pupils equal round and reactive to light [Cranial Nerves Oculomotor (III)] : extraocular motion intact [Cranial Nerves Trigeminal (V)] : facial sensation intact symmetrically [Cranial Nerves Facial (VII)] : face symmetrical [Cranial Nerves Glossopharyngeal (IX)] : tongue and palate midline [Cranial Nerves Accessory (XI - Cranial And Spinal)] : head turning and shoulder shrug symmetric [Cranial Nerves Hypoglossal (XII)] : there was no tongue deviation with protrusion [Motor Tone] : muscle tone was normal in all four extremities [Motor Strength] : muscle strength was normal in all four extremities [Involuntary Movements] : no involuntary movements were seen [No Muscle Atrophy] : normal bulk in all four extremities [Motor Handedness Left-Handed] : the patient is left hand dominant [Paresis Pronator Drift Right-Sided] : no pronator drift on the right [Paresis Pronator Drift Left-Sided] : no pronator drift on the left [Sensation Tactile Decrease] : light touch was intact [Sensation Pain / Temperature Decrease] : pain and temperature was intact [Sensation Vibration Decrease] : vibration was intact [Proprioception] : proprioception was intact [Romberg's Sign] : Romberg's sign was negtive [Abnormal Walk] : normal gait [Balance] : balance was intact [Non-ambulatory] : Non-ambulatory [Limited Balance] : balance was intact [Past-pointing] : there was no past-pointing [Tremor] : no tremor present [Coordination - Dysmetria Impaired Finger-to-Nose Bilateral] : not present [3+] : Patella right 3+ [2+] : Ankle jerk left 2+ [Plantar Reflex Right Only] : normal on the right [Plantar Reflex Left Only] : normal on the left [FreeTextEntry5] : decreased hearing on left side  [FreeTextEntry8] : Tandem gait was normal [Extraocular Movements] : extraocular movements were intact [Outer Ear] : the ears and nose were normal in appearance [Neck Appearance] : the appearance of the neck was normal [] : no rash [Skin Lesions] : no skin lesions

## 2019-07-01 NOTE — DISCUSSION/SUMMARY
[FreeTextEntry1] : 1. Hx of occipital AF closure: will obtain MRI brain and MRI IAC and orbits without contrast\par \par 2. Will obtian EEG\par \par 3. Follow up with eye doctor and all questions answered.\par \par 4. Follow up in 3 months

## 2019-07-02 ENCOUNTER — APPOINTMENT (OUTPATIENT)
Dept: NEUROLOGY | Facility: CLINIC | Age: 49
End: 2019-07-02
Payer: MEDICAID

## 2019-07-02 PROCEDURE — 95816 EEG AWAKE AND DROWSY: CPT

## 2019-07-03 ENCOUNTER — OTHER (OUTPATIENT)
Age: 49
End: 2019-07-03

## 2019-07-05 ENCOUNTER — OTHER (OUTPATIENT)
Age: 49
End: 2019-07-05

## 2019-07-06 ENCOUNTER — FORM ENCOUNTER (OUTPATIENT)
Age: 49
End: 2019-07-06

## 2019-07-07 ENCOUNTER — OUTPATIENT (OUTPATIENT)
Dept: OUTPATIENT SERVICES | Facility: HOSPITAL | Age: 49
LOS: 1 days | End: 2019-07-07
Payer: MEDICAID

## 2019-07-07 ENCOUNTER — APPOINTMENT (OUTPATIENT)
Dept: MRI IMAGING | Facility: CLINIC | Age: 49
End: 2019-07-07
Payer: MEDICAID

## 2019-07-07 DIAGNOSIS — Z98.890 OTHER SPECIFIED POSTPROCEDURAL STATES: Chronic | ICD-10-CM

## 2019-07-07 DIAGNOSIS — I67.1 CEREBRAL ANEURYSM, NONRUPTURED: ICD-10-CM

## 2019-07-07 PROCEDURE — 70551 MRI BRAIN STEM W/O DYE: CPT | Mod: 26

## 2019-07-07 PROCEDURE — 70540 MRI ORBIT/FACE/NECK W/O DYE: CPT

## 2019-07-07 PROCEDURE — 70551 MRI BRAIN STEM W/O DYE: CPT

## 2019-07-07 PROCEDURE — 70540 MRI ORBIT/FACE/NECK W/O DYE: CPT | Mod: 26

## 2019-07-09 ENCOUNTER — MESSAGE (OUTPATIENT)
Age: 49
End: 2019-07-09

## 2019-07-09 ENCOUNTER — RESULT REVIEW (OUTPATIENT)
Age: 49
End: 2019-07-09

## 2019-07-24 ENCOUNTER — APPOINTMENT (OUTPATIENT)
Dept: PULMONOLOGY | Facility: CLINIC | Age: 49
End: 2019-07-24
Payer: MEDICAID

## 2019-07-24 VITALS
HEART RATE: 85 BPM | TEMPERATURE: 98 F | OXYGEN SATURATION: 96 % | RESPIRATION RATE: 16 BRPM | WEIGHT: 141.13 LBS | SYSTOLIC BLOOD PRESSURE: 127 MMHG | BODY MASS INDEX: 20.84 KG/M2 | DIASTOLIC BLOOD PRESSURE: 81 MMHG

## 2019-07-24 PROCEDURE — 99214 OFFICE O/P EST MOD 30 MIN: CPT | Mod: GC

## 2019-07-24 RX ORDER — DIPHENHYDRAMINE HYDROCHLORIDE 50 MG/ML
50 INJECTION, SOLUTION INTRAMUSCULAR; INTRAVENOUS
Qty: 2 | Refills: 0 | Status: DISCONTINUED | COMMUNITY
Start: 2019-05-31 | End: 2019-07-24

## 2019-07-24 NOTE — PHYSICAL EXAM
[General Appearance - Well Developed] : well developed [Normal Appearance] : normal appearance [Well Groomed] : well groomed [General Appearance - Well Nourished] : well nourished [No Deformities] : no deformities [General Appearance - In No Acute Distress] : no acute distress [Eyelids - No Xanthelasma] : the eyelids demonstrated no xanthelasmas [Normal Conjunctiva] : the conjunctiva exhibited no abnormalities [Normal Oropharynx] : normal oropharynx [Neck Appearance] : the appearance of the neck was normal [Neck Cervical Mass (___cm)] : no neck mass was observed [Jugular Venous Distention Increased] : there was no jugular-venous distention [Thyroid Diffuse Enlargement] : the thyroid was not enlarged [Thyroid Nodule] : there were no palpable thyroid nodules [Heart Sounds] : normal S1 and S2 [Heart Rate And Rhythm] : heart rate was normal and rhythm regular [Heart Sounds Gallop] : no gallops [Murmurs] : no murmurs [Heart Sounds Pericardial Friction Rub] : no pericardial rub [Auscultation Breath Sounds / Voice Sounds] : lungs were clear to auscultation bilaterally [Bowel Sounds] : normal bowel sounds [Abdomen Tenderness] : non-tender [Abdomen Soft] : soft [Abdomen Mass (___ Cm)] : no abdominal mass palpated [Abnormal Walk] : normal gait [Motor Tone] : muscle strength and tone were normal [Musculoskeletal - Swelling] : no joint swelling seen [Nail Clubbing] : no clubbing of the fingernails [Cyanosis, Localized] : no localized cyanosis [Petechial Hemorrhages (___cm)] : no petechial hemorrhages [Skin Color & Pigmentation] : normal skin color and pigmentation [Skin Turgor] : normal skin turgor [Deep Tendon Reflexes (DTR)] : deep tendon reflexes were 2+ and symmetric [] : no rash [Sensation] : the sensory exam was normal to light touch and pinprick [No Focal Deficits] : no focal deficits [Oriented To Time, Place, And Person] : oriented to person, place, and time [Impaired Insight] : insight and judgment were intact [Affect] : the affect was normal

## 2019-07-25 ENCOUNTER — APPOINTMENT (OUTPATIENT)
Dept: PULMONOLOGY | Facility: CLINIC | Age: 49
End: 2019-07-25
Payer: SELF-PAY

## 2019-07-25 PROCEDURE — 90791 PSYCH DIAGNOSTIC EVALUATION: CPT

## 2019-07-25 NOTE — REVIEW OF SYSTEMS
[Fatigue] : fatigue [Difficulty Maintaining Sleep] : difficulty maintaining sleep [Chronic Insomnia] : chronic  insomnia [Negative] : Psychiatric [Difficulty Initiating Sleep] : no difficulty falling asleep

## 2019-07-25 NOTE — HISTORY OF PRESENT ILLNESS
[Primary Insomnia] : primary insomnia [DMS] : DMS [Date: ___] : Date of most recent diagnostic polysomnogram: [unfilled] [AHI: ___ per hour] : Apnea-hypopnea index:  [unfilled] per hour [FreeTextEntry1] : 48 year old male with a significant pmhx of transverse dural sinus AV fistula who comes in for an evaluation of insomnia. He was last seen in this clinic in 2013 for the same complaint. His states that his insomnia began in 2013 after he took ciprofloxacin for a  infection and ended up having a significant amount of left sided swelling. Since that time he has experienced insomnia and tinnitus. When he was seen in 2013 he was offered a PSG, which was negative, and CBT with Dr. Cortez. He went to 2 sessions of CBT but stopped going due continued insomnia. Since that time, he underwent surgery for a dural sinus AV fistula and has experienced improvement with his initiation of sleep but continues to have problems with his sleep maintenance. He also noted worsening visual symptoms, tinnitus and arthralgias for which he is currently being treated for lyme disease with doxy and cipro. He had a recent HST on 02/2019 which showed an AHI of 0.5 with severely abnormal sleep architecture and sleep efficiency.\par He is seeking another opinion about what can be done with his sleep maintenance insomnia.  [DIS] : no DIS

## 2019-07-25 NOTE — ASSESSMENT
[FreeTextEntry1] : 49 year old male with a significant pmhx of transverse dural sinus AV fistula who comes in for a follow up visit for  insomnia. Reviewing his chart from 2013 his PSG at that time showed a normal PSG with no sleep disordered breathing. In addition, he had a repeat PSG done on 02/2019 which showed no e/o SDB but poor sleep efficiency and sleep architecture. Since he is experiencing sleep maintenance insomnia he was offered hypnotics to help consolidate his sleep with inadequate to response to zolpidem, exzopiclone and suvorexant in the past; will try  Mirtazipine 30mg qhs. He can also try REM fresh which is OTC extended release melatonin. He is educated and advised to undergo CBT for a prolong period of time to help consolidate his sleep without the use of a sleeping aid. He was referred to Dr. Kelsey Hernandez of the [facility] in this regard. overeall he is somewhat improved since his last assessment but still has fragmented sleep which hopefully will improve with the above measures. Follow up in 6 months.\par \par

## 2019-08-01 ENCOUNTER — APPOINTMENT (OUTPATIENT)
Dept: PULMONOLOGY | Facility: CLINIC | Age: 49
End: 2019-08-01
Payer: SELF-PAY

## 2019-08-01 PROCEDURE — 90834 PSYTX W PT 45 MINUTES: CPT

## 2019-08-09 ENCOUNTER — APPOINTMENT (OUTPATIENT)
Dept: NEUROLOGY | Facility: CLINIC | Age: 49
End: 2019-08-09

## 2019-08-22 PROBLEM — Z01.818 PRE-PROCEDURAL EXAMINATION: Status: ACTIVE | Noted: 2019-05-28

## 2019-08-27 ENCOUNTER — APPOINTMENT (OUTPATIENT)
Dept: PULMONOLOGY | Facility: CLINIC | Age: 49
End: 2019-08-27

## 2019-08-27 LAB
APPEARANCE: CLEAR
APTT BLD: 32.5 SEC
BASOPHILS # BLD AUTO: 0.04 K/UL
BASOPHILS NFR BLD AUTO: 1 %
BILIRUBIN URINE: NEGATIVE
BLOOD URINE: NEGATIVE
COLOR: YELLOW
EOSINOPHIL # BLD AUTO: 0.21 K/UL
EOSINOPHIL NFR BLD AUTO: 5.3 %
GLUCOSE QUALITATIVE U: NEGATIVE
HCT VFR BLD CALC: 41.4 %
HGB BLD-MCNC: 14 G/DL
IMM GRANULOCYTES NFR BLD AUTO: 0.3 %
INR PPP: 1.09 RATIO
KETONES URINE: NEGATIVE
LEUKOCYTE ESTERASE URINE: NEGATIVE
LYMPHOCYTES # BLD AUTO: 1.18 K/UL
LYMPHOCYTES NFR BLD AUTO: 29.9 %
MAN DIFF?: NORMAL
MCHC RBC-ENTMCNC: 30.6 PG
MCHC RBC-ENTMCNC: 33.8 GM/DL
MCV RBC AUTO: 90.6 FL
MONOCYTES # BLD AUTO: 0.27 K/UL
MONOCYTES NFR BLD AUTO: 6.9 %
NEUTROPHILS # BLD AUTO: 2.23 K/UL
NEUTROPHILS NFR BLD AUTO: 56.6 %
NITRITE URINE: NEGATIVE
PH URINE: 5.5
PLATELET # BLD AUTO: 194 K/UL
PROTEIN URINE: NEGATIVE
PT BLD: 12.4 SEC
RBC # BLD: 4.57 M/UL
RBC # FLD: 12.4 %
SPECIFIC GRAVITY URINE: 1.02
UROBILINOGEN URINE: NORMAL
WBC # FLD AUTO: 3.94 K/UL

## 2019-08-28 LAB
ALBUMIN SERPL ELPH-MCNC: 4.4 G/DL
ALP BLD-CCNC: 40 U/L
ALT SERPL-CCNC: 14 U/L
ANION GAP SERPL CALC-SCNC: 14 MMOL/L
AST SERPL-CCNC: 26 U/L
BILIRUB SERPL-MCNC: 0.4 MG/DL
BUN SERPL-MCNC: 17 MG/DL
CALCIUM SERPL-MCNC: 9.2 MG/DL
CHLORIDE SERPL-SCNC: 104 MMOL/L
CO2 SERPL-SCNC: 24 MMOL/L
CREAT SERPL-MCNC: 1.05 MG/DL
GLUCOSE SERPL-MCNC: 106 MG/DL
POTASSIUM SERPL-SCNC: 4 MMOL/L
PROT SERPL-MCNC: 6.7 G/DL
SODIUM SERPL-SCNC: 141 MMOL/L

## 2019-08-29 ENCOUNTER — APPOINTMENT (OUTPATIENT)
Dept: INTERNAL MEDICINE | Facility: CLINIC | Age: 49
End: 2019-08-29
Payer: MEDICAID

## 2019-08-29 ENCOUNTER — NON-APPOINTMENT (OUTPATIENT)
Age: 49
End: 2019-08-29

## 2019-08-29 VITALS
BODY MASS INDEX: 22.51 KG/M2 | HEART RATE: 72 BPM | OXYGEN SATURATION: 96 % | SYSTOLIC BLOOD PRESSURE: 135 MMHG | HEIGHT: 69 IN | WEIGHT: 152 LBS | DIASTOLIC BLOOD PRESSURE: 75 MMHG | RESPIRATION RATE: 12 BRPM | TEMPERATURE: 98.4 F

## 2019-08-29 DIAGNOSIS — Z01.818 ENCOUNTER FOR OTHER PREPROCEDURAL EXAMINATION: ICD-10-CM

## 2019-08-29 PROCEDURE — 93000 ELECTROCARDIOGRAM COMPLETE: CPT

## 2019-08-29 PROCEDURE — 99205 OFFICE O/P NEW HI 60 MIN: CPT | Mod: 25

## 2019-08-29 PROCEDURE — 99213 OFFICE O/P EST LOW 20 MIN: CPT

## 2019-08-29 NOTE — HISTORY OF PRESENT ILLNESS
[No Pertinent Cardiac History] : no history of aortic stenosis, atrial fibrillation, coronary artery disease, recent myocardial infarction, or implantable device/pacemaker [No Pertinent Pulmonary History] : no history of asthma, COPD, sleep apnea, or smoking [Chronic Anticoagulation] : no chronic anticoagulation [No Adverse Anesthesia Reaction] : no adverse anesthesia reaction in self or family member [Chronic Kidney Disease] : no chronic kidney disease [Diabetes] : no diabetes [FreeTextEntry1] : CEREBRAL ANGIO [FreeTextEntry3] : DR ANDRES [FreeTextEntry2] : 9/6/19

## 2019-08-29 NOTE — PHYSICAL EXAM
[No Acute Distress] : no acute distress [Well Developed] : well developed [Well Nourished] : well nourished [Well-Appearing] : well-appearing [Normal Sclera/Conjunctiva] : normal sclera/conjunctiva [PERRL] : pupils equal round and reactive to light [EOMI] : extraocular movements intact [Normal Outer Ear/Nose] : the outer ears and nose were normal in appearance [Normal Oropharynx] : the oropharynx was normal [No JVD] : no jugular venous distention [No Lymphadenopathy] : no lymphadenopathy [Supple] : supple [Thyroid Normal, No Nodules] : the thyroid was normal and there were no nodules present [No Respiratory Distress] : no respiratory distress  [Normal Rate] : normal rate  [Clear to Auscultation] : lungs were clear to auscultation bilaterally [No Accessory Muscle Use] : no accessory muscle use [Regular Rhythm] : with a regular rhythm [Normal S1, S2] : normal S1 and S2 [No Murmur] : no murmur heard [No Carotid Bruits] : no carotid bruits [No Abdominal Bruit] : a ~M bruit was not heard ~T in the abdomen [Pedal Pulses Present] : the pedal pulses are present [No Varicosities] : no varicosities [No Edema] : there was no peripheral edema [No Palpable Aorta] : no palpable aorta [No Extremity Clubbing/Cyanosis] : no extremity clubbing/cyanosis [Soft] : abdomen soft [Non Tender] : non-tender [Non-distended] : non-distended [No Masses] : no abdominal mass palpated [No HSM] : no HSM [Normal Posterior Cervical Nodes] : no posterior cervical lymphadenopathy [Normal Bowel Sounds] : normal bowel sounds [Normal Anterior Cervical Nodes] : no anterior cervical lymphadenopathy [No CVA Tenderness] : no CVA  tenderness [No Spinal Tenderness] : no spinal tenderness [No Joint Swelling] : no joint swelling [Grossly Normal Strength/Tone] : grossly normal strength/tone [No Rash] : no rash [Coordination Grossly Intact] : coordination grossly intact [No Focal Deficits] : no focal deficits [Normal Gait] : normal gait [Normal Affect] : the affect was normal [Deep Tendon Reflexes (DTR)] : deep tendon reflexes were 2+ and symmetric [de-identified] : R ARM PICC LINE [Normal Insight/Judgement] : insight and judgment were intact

## 2019-08-29 NOTE — ASSESSMENT
[No Further Testing Recommended] : no further testing recommended [Patient Optimized for Surgery] : Patient optimized for surgery [As per surgery] : as per surgery [FreeTextEntry4] : 49 Y OLD MALE AT ACCEPTABLE RISK FOR CEREBRAL ANGIO

## 2019-09-04 ENCOUNTER — EMERGENCY (EMERGENCY)
Facility: HOSPITAL | Age: 49
LOS: 1 days | Discharge: HOME | End: 2019-09-04
Attending: EMERGENCY MEDICINE
Payer: MEDICAID

## 2019-09-04 VITALS
TEMPERATURE: 98 F | HEART RATE: 78 BPM | SYSTOLIC BLOOD PRESSURE: 108 MMHG | WEIGHT: 147.93 LBS | HEIGHT: 69 IN | RESPIRATION RATE: 16 BRPM | DIASTOLIC BLOOD PRESSURE: 68 MMHG | OXYGEN SATURATION: 96 %

## 2019-09-04 DIAGNOSIS — Z98.890 OTHER SPECIFIED POSTPROCEDURAL STATES: Chronic | ICD-10-CM

## 2019-09-04 LAB
ALBUMIN SERPL ELPH-MCNC: 4.5 G/DL — SIGNIFICANT CHANGE UP (ref 3.3–5)
ALP SERPL-CCNC: 48 U/L — SIGNIFICANT CHANGE UP (ref 40–120)
ALT FLD-CCNC: 15 U/L — SIGNIFICANT CHANGE UP (ref 10–45)
ANION GAP SERPL CALC-SCNC: 12 MMOL/L — SIGNIFICANT CHANGE UP (ref 5–17)
AST SERPL-CCNC: 23 U/L — SIGNIFICANT CHANGE UP (ref 10–40)
BASOPHILS # BLD AUTO: 0 K/UL — SIGNIFICANT CHANGE UP (ref 0–0.2)
BASOPHILS NFR BLD AUTO: 0.6 % — SIGNIFICANT CHANGE UP (ref 0–2)
BILIRUB SERPL-MCNC: 0.3 MG/DL — SIGNIFICANT CHANGE UP (ref 0.2–1.2)
BUN SERPL-MCNC: 12 MG/DL — SIGNIFICANT CHANGE UP (ref 7–23)
CALCIUM SERPL-MCNC: 9.4 MG/DL — SIGNIFICANT CHANGE UP (ref 8.4–10.5)
CHLORIDE SERPL-SCNC: 103 MMOL/L — SIGNIFICANT CHANGE UP (ref 96–108)
CO2 SERPL-SCNC: 26 MMOL/L — SIGNIFICANT CHANGE UP (ref 22–31)
CREAT SERPL-MCNC: 1.09 MG/DL — SIGNIFICANT CHANGE UP (ref 0.5–1.3)
CRP SERPL-MCNC: <0.1 MG/DL — SIGNIFICANT CHANGE UP (ref 0–0.4)
EOSINOPHIL # BLD AUTO: 0.4 K/UL — SIGNIFICANT CHANGE UP (ref 0–0.5)
EOSINOPHIL NFR BLD AUTO: 6.1 % — HIGH (ref 0–6)
GLUCOSE SERPL-MCNC: 60 MG/DL — LOW (ref 70–99)
HCT VFR BLD CALC: 44 % — SIGNIFICANT CHANGE UP (ref 39–50)
HGB BLD-MCNC: 14.9 G/DL — SIGNIFICANT CHANGE UP (ref 13–17)
LYMPHOCYTES # BLD AUTO: 1.8 K/UL — SIGNIFICANT CHANGE UP (ref 1–3.3)
LYMPHOCYTES # BLD AUTO: 30.5 % — SIGNIFICANT CHANGE UP (ref 13–44)
MCHC RBC-ENTMCNC: 31.2 PG — SIGNIFICANT CHANGE UP (ref 27–34)
MCHC RBC-ENTMCNC: 34 GM/DL — SIGNIFICANT CHANGE UP (ref 32–36)
MCV RBC AUTO: 91.7 FL — SIGNIFICANT CHANGE UP (ref 80–100)
MONOCYTES # BLD AUTO: 0.5 K/UL — SIGNIFICANT CHANGE UP (ref 0–0.9)
MONOCYTES NFR BLD AUTO: 8.2 % — SIGNIFICANT CHANGE UP (ref 2–14)
NEUTROPHILS # BLD AUTO: 3.2 K/UL — SIGNIFICANT CHANGE UP (ref 1.8–7.4)
NEUTROPHILS NFR BLD AUTO: 54.6 % — SIGNIFICANT CHANGE UP (ref 43–77)
PLATELET # BLD AUTO: 219 K/UL — SIGNIFICANT CHANGE UP (ref 150–400)
POTASSIUM SERPL-MCNC: 4.5 MMOL/L — SIGNIFICANT CHANGE UP (ref 3.5–5.3)
POTASSIUM SERPL-SCNC: 4.5 MMOL/L — SIGNIFICANT CHANGE UP (ref 3.5–5.3)
PROT SERPL-MCNC: 7 G/DL — SIGNIFICANT CHANGE UP (ref 6–8.3)
RBC # BLD: 4.79 M/UL — SIGNIFICANT CHANGE UP (ref 4.2–5.8)
RBC # FLD: 11.8 % — SIGNIFICANT CHANGE UP (ref 10.3–14.5)
SODIUM SERPL-SCNC: 141 MMOL/L — SIGNIFICANT CHANGE UP (ref 135–145)
WBC # BLD: 5.9 K/UL — SIGNIFICANT CHANGE UP (ref 3.8–10.5)
WBC # FLD AUTO: 5.9 K/UL — SIGNIFICANT CHANGE UP (ref 3.8–10.5)

## 2019-09-04 PROCEDURE — 99284 EMERGENCY DEPT VISIT MOD MDM: CPT

## 2019-09-04 PROCEDURE — 36584 COMPL RPLCMT PICC RS&I: CPT

## 2019-09-04 PROCEDURE — 85027 COMPLETE CBC AUTOMATED: CPT

## 2019-09-04 PROCEDURE — 86140 C-REACTIVE PROTEIN: CPT

## 2019-09-04 PROCEDURE — 71045 X-RAY EXAM CHEST 1 VIEW: CPT | Mod: 26

## 2019-09-04 PROCEDURE — 85652 RBC SED RATE AUTOMATED: CPT

## 2019-09-04 PROCEDURE — C1769: CPT

## 2019-09-04 PROCEDURE — 71045 X-RAY EXAM CHEST 1 VIEW: CPT

## 2019-09-04 PROCEDURE — 80053 COMPREHEN METABOLIC PANEL: CPT

## 2019-09-04 PROCEDURE — C1751: CPT

## 2019-09-04 PROCEDURE — 36415 COLL VENOUS BLD VENIPUNCTURE: CPT

## 2019-09-04 RX ORDER — ALTEPLASE 100 MG
2 KIT INTRAVENOUS ONCE
Refills: 0 | Status: COMPLETED | OUTPATIENT
Start: 2019-09-04 | End: 2019-09-04

## 2019-09-04 NOTE — ED PROVIDER NOTE - PROGRESS NOTE DETAILS
spoke to IV nurse - will see pt. recommends attempting CATHFLO 2 times IV nurse saw - recommends PICC exchange. Spoke to IR resident Tyler - will discuss case with pt's PA regarding scheduling PICC exchange. ATTG: : endorsed to Dr. Powell awaiting PICC line placement. ATTG: : endorsed to Dr. Powell awaiting PICC line placement. PICC line unable to fix / place line. IR consulted and will place line today. Pt taken to IR. Pt returned from PICC exchange - no complications and pt tolerated well. Will discharge with instructions for follow-up. Attending MD Powell: PICC exchanged, stable for discharge

## 2019-09-04 NOTE — ED PROVIDER NOTE - PATIENT PORTAL LINK FT
You can access the FollowMyHealth Patient Portal offered by [facility] by registering at the following website: [email or web address]. By joining [facility]’s FollowMyHealth portal, you will also be able to view your health information using other applications (apps) compatible with our system.

## 2019-09-04 NOTE — ED ADULT NURSE NOTE - OBJECTIVE STATEMENT
50 yo M c/o of picc line malfunction. Labs drawn, Provider at bedside. No acute distress.  Pt asymptomatic at this time.

## 2019-09-04 NOTE — ED ADULT NURSE NOTE - CHPI ED NUR SYMPTOMS NEG
no fever/no pain/no vomiting/no chills/no weakness/no tingling/no decreased eating/drinking/no nausea/no dizziness

## 2019-09-04 NOTE — ED PROVIDER NOTE - NSFOLLOWUPINSTRUCTIONS_ED_ALL_ED_FT
1. Continue all at-home medications for treatment of Lyme disease.    2. For pain control at the site, take tylenol 1000mg every 6-8 hours as needed.     3. Please follow up with your primary care physician or infectious disease doctor within the next 3-5 days.    4. Please return to ED for pain at the site of PICC placement, swelling, erythema, discharge, fever, chills, or any other concerning symptoms.

## 2019-09-04 NOTE — ED ADULT NURSE NOTE - NSIMPLEMENTINTERV_GEN_ALL_ED
Implemented All Universal Safety Interventions:  [place] to call system. Call bell, personal items and telephone within reach. Instruct patient to call for assistance. Room bathroom lighting operational. Non-slip footwear when patient is off stretcher. Physically safe environment: no spills, clutter or unnecessary equipment. Stretcher in lowest position, wheels locked, appropriate side rails in place.

## 2019-09-04 NOTE — PROGRESS NOTE ADULT - SUBJECTIVE AND OBJECTIVE BOX
Interventional Radiology Brief- Operative Note    Procedure: PICC evaluation and exchange    Operators: Hien Martinez    Anesthesia (type): lidocaine local    Contrast: none    EBL: none    Findings/Follow up Plan of Care: Consent obtained following full discussion of risks, benefits, and alternatives. Pt verbalized understanding. Previously inserted PICC was coiled in region of right subclavian vein. Guidewire exchange performed with new 4 FR 45 cm SL picc inserted. Tip inSVC. OK t use. Pt tolerated procedure without difficulty. Full report to follow.    Specimens Removed: none    Implants: as above    Complications: none    Condition/Disposition: stable / ED    Please call Interventional Radiology [phone] with any questions, concerns, or issues.

## 2019-09-04 NOTE — ED PROVIDER NOTE - OBJECTIVE STATEMENT
49 y male PMHx lyme disease x 3 months with chronic tx of doxycycline/ceftriaxone via right PICC line. Yesterday the visiting nurse could not flush the line - tried cath maribel x 2 unsuccessful and was unable to draw his routine labs. Pt was instructed to come to the ED today. He denies swelling at the site, erythema, discharge, pain, fever, chills, numbness, or weakness in the affected extremity.

## 2019-09-04 NOTE — ED PROVIDER NOTE - CLINICAL SUMMARY MEDICAL DECISION MAKING FREE TEXT BOX
ATTG: : PICC line not working, check xray, check labs, iv medication, PICC line team eval. ATTG: : PICC line not working, check xray, check labs, iv medication, PICC line team eval. Pt had PICC exchange with no complications. Pt tolerated the procedure.

## 2019-09-04 NOTE — ED PROVIDER NOTE - SKIN, MLM
Right antecubital picc line failing to flush. No erythema, tenderness, swelling, or purulent discharge at the site. Skin normal color for race, warm, dry and intact. No evidence of rash.

## 2019-09-04 NOTE — ED PROVIDER NOTE - ATTENDING CONTRIBUTION TO CARE
48 y/o m with pmhx Chronic Lyme disease, on IV infusions, presents for non working PICC line.  Yesterday the visiting nurse could not flush the line - tried cath maribel x 2 unsuccessful and was unable to draw his routine labs. Sent to ED for further eval. denies any skin changes. no fever. no chills. no pain at site.   Gen.  no acute distress  HEENT:  perrl eomi   Lungs:  b/l; bs  CVS: S1S2   Abd;  soft non tender no distention  Ext: no pitting edema  right upper ext PICC line in place no surrounding erythema or edema. no ttp. no discharge.   Neuro: aaox3, no focal deficits  MSK:  strength 5/5 b/l upper and lower ext

## 2019-10-29 ENCOUNTER — APPOINTMENT (OUTPATIENT)
Dept: NEUROLOGY | Facility: CLINIC | Age: 49
End: 2019-10-29

## 2019-11-18 ENCOUNTER — RX RENEWAL (OUTPATIENT)
Age: 49
End: 2019-11-18

## 2020-03-19 ENCOUNTER — RX RENEWAL (OUTPATIENT)
Age: 50
End: 2020-03-19

## 2020-09-16 ENCOUNTER — APPOINTMENT (OUTPATIENT)
Dept: PULMONOLOGY | Facility: CLINIC | Age: 50
End: 2020-09-16
Payer: MEDICAID

## 2020-09-16 PROCEDURE — 99213 OFFICE O/P EST LOW 20 MIN: CPT | Mod: GC,95

## 2020-09-16 NOTE — REVIEW OF SYSTEMS
[Negative] : Psychiatric [Difficulty Initiating Sleep] : no difficulty falling asleep [Difficulty Maintaining Sleep] : no difficulty maintaining sleep [Lower Extremity Discomfort] : no lower extremity discomfort [Late day/ Evening symptoms] : no late day/evening symptoms [Unusual Sleep Behavior] : no unusual sleep behavior [Hypersomnolence] : not sleeping much more than usual [Cataplexy] :  no cataplexy [Sleep Paralysis] : no sleep paralysis

## 2020-09-16 NOTE — HISTORY OF PRESENT ILLNESS
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 Last seen on 7/24/2019 for follow up. Insomnia started in 2013 after cipro. Also experienced tinnitus at that time. Had CBT with Dr. Cortez but did not find it helpful. Underwent surgery for dural sinus AV fistula and experienced improvement though continues to have difficulty with sleep maintenance, though experienced visual symptoms soon after. At that time, he was given mirtazapine 30mg qhs and recommended REM fresh. Currently pt states that he is sleeping well through the night with mirtazapine 15 mg bed and melatonin ER 1mg prior to bedtime. Goes to sleep around 12AM and stays asleep until 8AM. Able to function well through the day and is interested in reducing the mirtazapine even further. \par \par PSG 2/2019: AHI 0.5, severely abnormal sleep architecture and sleep efficiency.

## 2020-09-16 NOTE — ASSESSMENT
[FreeTextEntry1] : 50M hx transverse dural sinus AV fistula, chronic insomnia, who comes for annual follow up. Initially had insomnia "after taking cipro". Has been treated with CBT in the past, which was unsuccessful. Now he is on mirtazapine 15 mg bed and melatonin 1mg hs with good response - he is able to initiate and maintain sleep. Wakes up refreshed and able to function through the day. No new medical issues since last visit. Interested in reducing mirtazapine, will titrate down to 7.5 mg bed. Advised pt that if this dose is ineffective, can go back to 15mg bed.

## 2020-09-16 NOTE — REASON FOR VISIT
[Home] : at home, [unfilled] , at the time of the visit. [Medical Office: ([facility])___] : at the medical office located in  [Follow-Up] : a follow-up visit [Verbal consent obtained from patient] : the patient, [unfilled] [FreeTextEntry2] : chronic insomnia

## 2020-11-04 ENCOUNTER — NON-APPOINTMENT (OUTPATIENT)
Age: 50
End: 2020-11-04

## 2020-11-04 RX ORDER — MIRTAZAPINE 7.5 MG/1
7.5 TABLET, FILM COATED ORAL
Qty: 90 | Refills: 0 | Status: DISCONTINUED | COMMUNITY
Start: 2020-09-16 | End: 2020-11-04

## 2020-11-28 NOTE — ED PROVIDER NOTE - PMH
TRANSFER - OUT REPORT: 
 
Verbal report given to [facility] on Gabe Clayton  being transferred to  for routine progression of care Report consisted of patients Situation, Background, Assessment and  
Recommendations(SBAR). Information from the following report(s) SBAR and ED Summary was reviewed with the receiving nurse. Lines:  
Peripheral IV 11/28/20 Right Hand (Active) Site Assessment Clean, dry, & intact 11/28/20 1121 Phlebitis Assessment 0 11/28/20 1121 Infiltration Assessment 0 11/28/20 1121 Dressing Status New 11/28/20 1121 Hub Color/Line Status Pink 11/28/20 1121 Opportunity for questions and clarification was provided. Patient transported with: 
 [employer] Hypotestosteronism  resolved

## 2021-03-22 NOTE — ED ADULT NURSE NOTE - NEURO ASSESSMENT
WDL Topical Retinoid Pregnancy And Lactation Text: This medication is Pregnancy Category C. It is unknown if this medication is excreted in breast milk.

## 2021-06-04 ENCOUNTER — RX RENEWAL (OUTPATIENT)
Age: 51
End: 2021-06-04

## 2021-08-11 ENCOUNTER — RX RENEWAL (OUTPATIENT)
Age: 51
End: 2021-08-11

## 2021-09-09 ENCOUNTER — APPOINTMENT (OUTPATIENT)
Dept: INTERNAL MEDICINE | Facility: CLINIC | Age: 51
End: 2021-09-09
Payer: MEDICAID

## 2021-09-09 VITALS
HEIGHT: 69 IN | OXYGEN SATURATION: 98 % | SYSTOLIC BLOOD PRESSURE: 123 MMHG | WEIGHT: 154 LBS | RESPIRATION RATE: 12 BRPM | TEMPERATURE: 98.4 F | DIASTOLIC BLOOD PRESSURE: 78 MMHG | HEART RATE: 59 BPM | BODY MASS INDEX: 22.81 KG/M2

## 2021-09-09 DIAGNOSIS — S60.519A ABRASION OF UNSPECIFIED HAND, INITIAL ENCOUNTER: ICD-10-CM

## 2021-09-09 DIAGNOSIS — S80.811A ABRASION, RIGHT LOWER LEG, INITIAL ENCOUNTER: ICD-10-CM

## 2021-09-09 PROCEDURE — 99213 OFFICE O/P EST LOW 20 MIN: CPT

## 2021-09-09 NOTE — HISTORY OF PRESENT ILLNESS
[FreeTextEntry8] : 5 DAYS AGO PT TRIP AND FELL ON BACK YARD AND SUSTAINED BL;UNT TRAUMA TO R SHIN AND ABRASION BOTH PALMS AND R SHIN AS WELL ;TAKING AMOX 500 MG BID ON HIS OWN FROM 2 Y OLD RX \par NO FEVER ,NO CHILLS ,NO EXUDATE OF ABRASIONS

## 2021-09-09 NOTE — ASSESSMENT
[FreeTextEntry1] : ACUTE VISIT OF 51 Y OLD MALE S/P FALL AND ABRASION OF RLE AND JIGAR PALMS= LOCAL WOUND CARE EXPLAINED ;NO NEED FOR ORAL  ANTIBIOTICS AT PRESENT TIME

## 2021-09-09 NOTE — PHYSICAL EXAM
[No JVD] : no jugular venous distention [No Respiratory Distress] : no respiratory distress  [Normal Rate] : normal rate  [No Edema] : there was no peripheral edema [Soft] : abdomen soft [Normal] : no joint swelling and grossly normal strength and tone [de-identified] : LINEAR ABRASSION OF R VASQUEZ WITH NO SIGN OF INFECTION ;HEALING PALM ABRASIONS JIGAR

## 2021-10-19 ENCOUNTER — APPOINTMENT (OUTPATIENT)
Dept: INTERNAL MEDICINE | Facility: CLINIC | Age: 51
End: 2021-10-19
Payer: MEDICAID

## 2021-10-19 VITALS
DIASTOLIC BLOOD PRESSURE: 77 MMHG | OXYGEN SATURATION: 98 % | RESPIRATION RATE: 12 BRPM | TEMPERATURE: 96.8 F | HEART RATE: 75 BPM | SYSTOLIC BLOOD PRESSURE: 123 MMHG | BODY MASS INDEX: 23.4 KG/M2 | WEIGHT: 158 LBS | HEIGHT: 69 IN

## 2021-10-19 DIAGNOSIS — Z00.00 ENCOUNTER FOR GENERAL ADULT MEDICAL EXAMINATION W/OUT ABNORMAL FINDINGS: ICD-10-CM

## 2021-10-19 PROCEDURE — 99396 PREV VISIT EST AGE 40-64: CPT

## 2021-10-19 PROCEDURE — 99213 OFFICE O/P EST LOW 20 MIN: CPT

## 2021-10-20 LAB
COVID-19 NUCLEOCAPSID  GAM ANTIBODY INTERPRETATION: NEGATIVE
SARS-COV-2 AB SERPL QL IA: 0.07 INDEX

## 2021-10-20 NOTE — REVIEW OF SYSTEMS
[Negative] : Heme/Lymph [FreeTextEntry9] : right lower extremity swelling, right sided lower back pressure

## 2021-10-20 NOTE — HISTORY OF PRESENT ILLNESS
[de-identified] : Carlo Solano  51 year old male with history of insomnia presents today for annual  physical and f/u after fall\par \par He reports that he fell a couple of weeks back and hit his right shin. He denies pain but still having swelling in right leg.  Denies numbness/tingling, fevers, chills\par He also is experiencing right sided lower back pressure since the fall. \par \par Denies any chest pain, SOB, N/V or abdominal pain\par \par Last colonoscopy 10 years ago

## 2021-10-20 NOTE — PHYSICAL EXAM
[No Acute Distress] : no acute distress [Well Nourished] : well nourished [Normal Sclera/Conjunctiva] : normal sclera/conjunctiva [PERRL] : pupils equal round and reactive to light [EOMI] : extraocular movements intact [Normal Outer Ear/Nose] : the outer ears and nose were normal in appearance [Normal Oropharynx] : the oropharynx was normal [No JVD] : no jugular venous distention [No Lymphadenopathy] : no lymphadenopathy [Supple] : supple [No Respiratory Distress] : no respiratory distress  [No Accessory Muscle Use] : no accessory muscle use [Clear to Auscultation] : lungs were clear to auscultation bilaterally [Normal Rate] : normal rate  [Regular Rhythm] : with a regular rhythm [Normal S1, S2] : normal S1 and S2 [No Murmur] : no murmur heard [Pedal Pulses Present] : the pedal pulses are present [No Edema] : there was no peripheral edema [No Extremity Clubbing/Cyanosis] : no extremity clubbing/cyanosis [Soft] : abdomen soft [Non Tender] : non-tender [Non-distended] : non-distended [Normal Bowel Sounds] : normal bowel sounds [Normal Posterior Cervical Nodes] : no posterior cervical lymphadenopathy [Normal Anterior Cervical Nodes] : no anterior cervical lymphadenopathy [No CVA Tenderness] : no CVA  tenderness [No Spinal Tenderness] : no spinal tenderness [No Joint Swelling] : no joint swelling [Grossly Normal Strength/Tone] : grossly normal strength/tone [No Rash] : no rash [Coordination Grossly Intact] : coordination grossly intact [No Focal Deficits] : no focal deficits [Normal Gait] : normal gait [Normal Affect] : the affect was normal [Deep Tendon Reflexes (DTR)] : deep tendon reflexes were 2+ and symmetric [Normal Insight/Judgement] : insight and judgment were intact [de-identified] : right shin abrasion healing, no swelling, no evidence of infection

## 2021-10-20 NOTE — PLAN
[FreeTextEntry1] : Physical\par \par Declines flu shot today\par \par 1. s/p fall right leg swelling\par xr tibial/fibula \par RLE doppler

## 2021-10-21 LAB
25(OH)D3 SERPL-MCNC: 28.9 NG/ML
ALBUMIN SERPL ELPH-MCNC: 5 G/DL
ALP BLD-CCNC: 51 U/L
ALT SERPL-CCNC: 12 U/L
ANION GAP SERPL CALC-SCNC: 15 MMOL/L
APPEARANCE: CLEAR
AST SERPL-CCNC: 20 U/L
BILIRUB SERPL-MCNC: 0.7 MG/DL
BILIRUBIN URINE: NEGATIVE
BLOOD URINE: NEGATIVE
BUN SERPL-MCNC: 15 MG/DL
CALCIUM SERPL-MCNC: 9.6 MG/DL
CHLORIDE SERPL-SCNC: 101 MMOL/L
CHOLEST SERPL-MCNC: 203 MG/DL
CO2 SERPL-SCNC: 22 MMOL/L
COLOR: NORMAL
CREAT SERPL-MCNC: 1.07 MG/DL
ESTIMATED AVERAGE GLUCOSE: 103 MG/DL
GLUCOSE QUALITATIVE U: NEGATIVE
GLUCOSE SERPL-MCNC: 75 MG/DL
HBA1C MFR BLD HPLC: 5.2 %
HDLC SERPL-MCNC: 56 MG/DL
KETONES URINE: NEGATIVE
LDLC SERPL CALC-MCNC: 126 MG/DL
LEUKOCYTE ESTERASE URINE: NEGATIVE
NITRITE URINE: NEGATIVE
NONHDLC SERPL-MCNC: 146 MG/DL
PH URINE: 7
POTASSIUM SERPL-SCNC: 4.3 MMOL/L
PROT SERPL-MCNC: 7.7 G/DL
PROTEIN URINE: NEGATIVE
PSA SERPL-MCNC: 2.21 NG/ML
SODIUM SERPL-SCNC: 138 MMOL/L
SPECIFIC GRAVITY URINE: 1.01
TRIGL SERPL-MCNC: 102 MG/DL
TSH SERPL-ACNC: 1.57 UIU/ML
UROBILINOGEN URINE: NORMAL
VIT B12 SERPL-MCNC: 1245 PG/ML

## 2021-11-10 DIAGNOSIS — E78.00 PURE HYPERCHOLESTEROLEMIA, UNSPECIFIED: ICD-10-CM

## 2021-11-10 LAB
BASOPHILS # BLD AUTO: 0.06 K/UL
BASOPHILS NFR BLD AUTO: 1.1 %
EOSINOPHIL # BLD AUTO: 0.29 K/UL
EOSINOPHIL NFR BLD AUTO: 5.5 %
HCT VFR BLD CALC: 46.6 %
HGB BLD-MCNC: 15.6 G/DL
IMM GRANULOCYTES NFR BLD AUTO: 0.2 %
LYMPHOCYTES # BLD AUTO: 1.69 K/UL
LYMPHOCYTES NFR BLD AUTO: 31.9 %
MAN DIFF?: NORMAL
MCHC RBC-ENTMCNC: 30.1 PG
MCHC RBC-ENTMCNC: 33.5 GM/DL
MCV RBC AUTO: 89.8 FL
MONOCYTES # BLD AUTO: 0.42 K/UL
MONOCYTES NFR BLD AUTO: 7.9 %
NEUTROPHILS # BLD AUTO: 2.82 K/UL
NEUTROPHILS NFR BLD AUTO: 53.4 %
PLATELET # BLD AUTO: 243 K/UL
RBC # BLD: 5.19 M/UL
RBC # FLD: 12.9 %
WBC # FLD AUTO: 5.29 K/UL

## 2021-11-16 ENCOUNTER — APPOINTMENT (OUTPATIENT)
Dept: INTERNAL MEDICINE | Facility: CLINIC | Age: 51
End: 2021-11-16

## 2021-11-18 ENCOUNTER — RX RENEWAL (OUTPATIENT)
Age: 51
End: 2021-11-18

## 2022-03-14 ENCOUNTER — RX RENEWAL (OUTPATIENT)
Age: 52
End: 2022-03-14

## 2022-03-28 ENCOUNTER — APPOINTMENT (OUTPATIENT)
Dept: UROLOGY | Facility: CLINIC | Age: 52
End: 2022-03-28
Payer: MEDICAID

## 2022-03-28 VITALS
HEIGHT: 69 IN | BODY MASS INDEX: 21.77 KG/M2 | RESPIRATION RATE: 14 BRPM | WEIGHT: 147 LBS | DIASTOLIC BLOOD PRESSURE: 83 MMHG | OXYGEN SATURATION: 96 % | TEMPERATURE: 98 F | HEART RATE: 66 BPM | SYSTOLIC BLOOD PRESSURE: 145 MMHG

## 2022-03-28 DIAGNOSIS — R10.2 PELVIC AND PERINEAL PAIN: ICD-10-CM

## 2022-03-28 PROCEDURE — 99204 OFFICE O/P NEW MOD 45 MIN: CPT

## 2022-03-28 RX ORDER — HYDROCORTISONE ACETATE, PRAMOXINE HCL 2.5; 1 G/100G; G/100G
2.5-1 CREAM TOPICAL
Qty: 30 | Refills: 0 | Status: ACTIVE | COMMUNITY
Start: 2022-03-05

## 2022-03-28 NOTE — PHYSICAL EXAM
[General Appearance - Well Developed] : well developed [General Appearance - Well Nourished] : well nourished [Normal Appearance] : normal appearance [Well Groomed] : well groomed [General Appearance - In No Acute Distress] : no acute distress [Edema] : no peripheral edema [Respiration, Rhythm And Depth] : normal respiratory rhythm and effort [Exaggerated Use Of Accessory Muscles For Inspiration] : no accessory muscle use [Abdomen Soft] : soft [Abdomen Tenderness] : non-tender [Costovertebral Angle Tenderness] : no ~M costovertebral angle tenderness [Urethral Meatus] : meatus normal [Urinary Bladder Findings] : the bladder was normal on palpation [Scrotum] : the scrotum was normal [No Prostate Nodules] : no prostate nodules [Testes Mass (___cm)] : there were no testicular masses [FreeTextEntry1] : No prostate tenderness.  Perineal discomfort to palpation [Normal Station and Gait] : the gait and station were normal for the patient's age [] : no rash [No Focal Deficits] : no focal deficits [Oriented To Time, Place, And Person] : oriented to person, place, and time [Affect] : the affect was normal [Mood] : the mood was normal [Not Anxious] : not anxious [No Palpable Adenopathy] : no palpable adenopathy

## 2022-03-28 NOTE — ASSESSMENT
[FreeTextEntry1] : Very pleasant 51-year-old gentleman who presents for evaluation of perineal discomfort, concern for prostatitis\par -Urinalysis\par -Urine culture\par -GC/chlamydia\par -Ureaplasma/mycoplasma\par -PSA from October 2021 was 2.2\par -Start doxycycline given concern for prostatitis\par -We discussed the risks and benefits of doxycycline\par -Follow-up in 1 month - - -

## 2022-03-28 NOTE — HISTORY OF PRESENT ILLNESS
[FreeTextEntry1] : Very pleasant 51-year-old gentleman who presents for evaluation of perineal discomfort and BPH.  He reports that he recently began to experience perineal discomfort and went to an urgent care center.  He was prescribed Augmentin for 2 weeks and reports it has improved his symptoms, however his symptoms did not completely resolve.  He denies dysuria.  He reports a urethral discharge in the past.  He reports no history of sexually transmitted infections nor concern for sexually transmitted infections at this time.  He reports that he is not currently sexually active.\par \par Patient reports that he took Cipro in the past and had significant side effects from the medication.

## 2022-03-29 LAB
APPEARANCE: CLEAR
BACTERIA: NEGATIVE
BILIRUBIN URINE: NEGATIVE
BLOOD URINE: NEGATIVE
C TRACH RRNA SPEC QL NAA+PROBE: NOT DETECTED
COLOR: COLORLESS
GLUCOSE QUALITATIVE U: NEGATIVE
HYALINE CASTS: 0 /LPF
KETONES URINE: NEGATIVE
LEUKOCYTE ESTERASE URINE: NEGATIVE
MICROSCOPIC-UA: NORMAL
N GONORRHOEA RRNA SPEC QL NAA+PROBE: NOT DETECTED
NITRITE URINE: NEGATIVE
PH URINE: 7
PROTEIN URINE: NEGATIVE
RED BLOOD CELLS URINE: 0 /HPF
SOURCE AMPLIFICATION: NORMAL
SPECIFIC GRAVITY URINE: 1.01
SQUAMOUS EPITHELIAL CELLS: 0 /HPF
UROBILINOGEN URINE: NORMAL
WHITE BLOOD CELLS URINE: 0 /HPF

## 2022-03-30 LAB — BACTERIA UR CULT: NORMAL

## 2022-04-04 ENCOUNTER — NON-APPOINTMENT (OUTPATIENT)
Age: 52
End: 2022-04-04

## 2022-04-04 ENCOUNTER — EMERGENCY (EMERGENCY)
Facility: HOSPITAL | Age: 52
LOS: 1 days | Discharge: HOME | End: 2022-04-04
Attending: EMERGENCY MEDICINE
Payer: MEDICAID

## 2022-04-04 VITALS
HEART RATE: 88 BPM | DIASTOLIC BLOOD PRESSURE: 73 MMHG | WEIGHT: 149.91 LBS | OXYGEN SATURATION: 95 % | RESPIRATION RATE: 18 BRPM | SYSTOLIC BLOOD PRESSURE: 106 MMHG | TEMPERATURE: 98 F | HEIGHT: 69 IN

## 2022-04-04 VITALS
TEMPERATURE: 98 F | HEART RATE: 55 BPM | RESPIRATION RATE: 18 BRPM | SYSTOLIC BLOOD PRESSURE: 126 MMHG | OXYGEN SATURATION: 98 % | DIASTOLIC BLOOD PRESSURE: 83 MMHG

## 2022-04-04 DIAGNOSIS — Z98.890 OTHER SPECIFIED POSTPROCEDURAL STATES: Chronic | ICD-10-CM

## 2022-04-04 LAB
ALBUMIN SERPL ELPH-MCNC: 5.3 G/DL — HIGH (ref 3.3–5)
ALP SERPL-CCNC: 50 U/L — SIGNIFICANT CHANGE UP (ref 40–120)
ALT FLD-CCNC: 11 U/L — SIGNIFICANT CHANGE UP (ref 10–45)
ANION GAP SERPL CALC-SCNC: 14 MMOL/L — SIGNIFICANT CHANGE UP (ref 5–17)
AST SERPL-CCNC: 22 U/L — SIGNIFICANT CHANGE UP (ref 10–40)
BASOPHILS # BLD AUTO: 0.09 K/UL — SIGNIFICANT CHANGE UP (ref 0–0.2)
BASOPHILS NFR BLD AUTO: 1.1 % — SIGNIFICANT CHANGE UP (ref 0–2)
BILIRUB SERPL-MCNC: 1.4 MG/DL — HIGH (ref 0.2–1.2)
BUN SERPL-MCNC: 10 MG/DL — SIGNIFICANT CHANGE UP (ref 7–23)
CALCIUM SERPL-MCNC: 10.1 MG/DL — SIGNIFICANT CHANGE UP (ref 8.4–10.5)
CHLORIDE SERPL-SCNC: 100 MMOL/L — SIGNIFICANT CHANGE UP (ref 96–108)
CO2 SERPL-SCNC: 26 MMOL/L — SIGNIFICANT CHANGE UP (ref 22–31)
CREAT SERPL-MCNC: 1.06 MG/DL — SIGNIFICANT CHANGE UP (ref 0.5–1.3)
EGFR: 85 ML/MIN/1.73M2 — SIGNIFICANT CHANGE UP
EOSINOPHIL # BLD AUTO: 0.13 K/UL — SIGNIFICANT CHANGE UP (ref 0–0.5)
EOSINOPHIL NFR BLD AUTO: 1.6 % — SIGNIFICANT CHANGE UP (ref 0–6)
GLUCOSE SERPL-MCNC: 97 MG/DL — SIGNIFICANT CHANGE UP (ref 70–99)
HCT VFR BLD CALC: 45 % — SIGNIFICANT CHANGE UP (ref 39–50)
HGB BLD-MCNC: 15.4 G/DL — SIGNIFICANT CHANGE UP (ref 13–17)
IMM GRANULOCYTES NFR BLD AUTO: 0.2 % — SIGNIFICANT CHANGE UP (ref 0–1.5)
LYMPHOCYTES # BLD AUTO: 1.96 K/UL — SIGNIFICANT CHANGE UP (ref 1–3.3)
LYMPHOCYTES # BLD AUTO: 23.7 % — SIGNIFICANT CHANGE UP (ref 13–44)
MCHC RBC-ENTMCNC: 29.3 PG — SIGNIFICANT CHANGE UP (ref 27–34)
MCHC RBC-ENTMCNC: 34.2 GM/DL — SIGNIFICANT CHANGE UP (ref 32–36)
MCV RBC AUTO: 85.7 FL — SIGNIFICANT CHANGE UP (ref 80–100)
MONOCYTES # BLD AUTO: 0.66 K/UL — SIGNIFICANT CHANGE UP (ref 0–0.9)
MONOCYTES NFR BLD AUTO: 8 % — SIGNIFICANT CHANGE UP (ref 2–14)
NEUTROPHILS # BLD AUTO: 5.41 K/UL — SIGNIFICANT CHANGE UP (ref 1.8–7.4)
NEUTROPHILS NFR BLD AUTO: 65.4 % — SIGNIFICANT CHANGE UP (ref 43–77)
NRBC # BLD: 0 /100 WBCS — SIGNIFICANT CHANGE UP (ref 0–0)
PLATELET # BLD AUTO: 277 K/UL — SIGNIFICANT CHANGE UP (ref 150–400)
POTASSIUM SERPL-MCNC: 3.9 MMOL/L — SIGNIFICANT CHANGE UP (ref 3.5–5.3)
POTASSIUM SERPL-SCNC: 3.9 MMOL/L — SIGNIFICANT CHANGE UP (ref 3.5–5.3)
PROT SERPL-MCNC: 7.8 G/DL — SIGNIFICANT CHANGE UP (ref 6–8.3)
RBC # BLD: 5.25 M/UL — SIGNIFICANT CHANGE UP (ref 4.2–5.8)
RBC # FLD: 12.7 % — SIGNIFICANT CHANGE UP (ref 10.3–14.5)
SODIUM SERPL-SCNC: 140 MMOL/L — SIGNIFICANT CHANGE UP (ref 135–145)
WBC # BLD: 8.27 K/UL — SIGNIFICANT CHANGE UP (ref 3.8–10.5)
WBC # FLD AUTO: 8.27 K/UL — SIGNIFICANT CHANGE UP (ref 3.8–10.5)

## 2022-04-04 PROCEDURE — 74177 CT ABD & PELVIS W/CONTRAST: CPT | Mod: 26,MA

## 2022-04-04 PROCEDURE — 74177 CT ABD & PELVIS W/CONTRAST: CPT | Mod: MA

## 2022-04-04 PROCEDURE — 85025 COMPLETE CBC W/AUTO DIFF WBC: CPT

## 2022-04-04 PROCEDURE — 99284 EMERGENCY DEPT VISIT MOD MDM: CPT | Mod: 25

## 2022-04-04 PROCEDURE — 36415 COLL VENOUS BLD VENIPUNCTURE: CPT

## 2022-04-04 PROCEDURE — 80053 COMPREHEN METABOLIC PANEL: CPT

## 2022-04-04 PROCEDURE — 99285 EMERGENCY DEPT VISIT HI MDM: CPT

## 2022-04-04 NOTE — ED PROVIDER NOTE - PROGRESS NOTE DETAILS
went over CT findings with patient including diverticulosis, liver findings, enlarged prostate, advised needs GI follow up and discussed return precautions for the ED including additional episodes of bleeding, syncope, sob, worsening pain or fevers. patient comfortable with plan for dc - Carmelina Farrell PA-C

## 2022-04-04 NOTE — ED PROVIDER NOTE - NSICDXPASTMEDICALHX_GEN_ALL_CORE_FT
PAST MEDICAL HISTORY:  DAVF (dural arteriovenous fistula) dx: ' 2017    Hypotestosteronism     Lyme disease suspected. Treated 5/2018 to 8/2018 with I.V. Antibiotic via PIC Line

## 2022-04-04 NOTE — ED PROVIDER NOTE - PATIENT PORTAL LINK FT
You can access the FollowMyHealth Patient Portal offered by [facility] by registering at the following website: [email or web address]. By joining [employer]’s FollowMyHealth portal, you will also be able to view your health information using other applications (apps) compatible with our system.

## 2022-04-04 NOTE — ED PROVIDER NOTE - OBJECTIVE STATEMENT
52 y/o male presents for llq pain for 2 weeks, last week went to pmd and was treated with doxycycline BID for possible prostatitis. felt the pain was improving but then yesterday had an episode of vomiting. no fever/chills/n/v. does state appetite not normal but pain not worse with eating. this morning had an episode of BRBPR. no prior constipation. no urinary complaints or testicular pain. 50 y/o male presents for llq pain for 2 weeks, last week went to pmd and was treated with doxycycline BID for possible prostatitis. felt the pain was improving but then yesterday had an episode of vomiting. no fever/chills/n/v. does state appetite not normal but pain not worse with eating. this morning had an episode of BRBPR. no prior constipation and after the episodes had a second BM without any blood. no urinary complaints or testicular pain.

## 2022-04-04 NOTE — ED PROVIDER NOTE - CARE PROVIDER_API CALL
Warren Agrawal (MD)  Gastroenterology  [address] [place] [address], [address]  [place] [address]  Phone: [phone]  Fax: [phone]  Follow Up Time:

## 2022-04-04 NOTE — ED ADULT NURSE NOTE - OBJECTIVE STATEMENT
51y Male presents to the ED c/o lower left quadrant abdominal pain. Pt states the pain began last week and since then it has worsened. Pt describes pain has constant and dull. Pt endorses vomiting last night after eating dinner and bloody stools this AM. Pt went to urgent care who then sent him into the ED. Pt denies CP, SOB, numbness, tingling, dysuria, hematuria, weakness, and headache. Pt is A&Ox3. Patient safety maintained, bed is in lowest position, wheels locked, and side rails raised. Patient oriented to call bell, and call bell is within reach.

## 2022-04-04 NOTE — ED PROVIDER NOTE - GASTROINTESTINAL, MLM
Patient complains of cough, shortness of breath, on and off chills since Saturday morning.   
Abdomen soft, mild ttp llq without rebound or guarding.

## 2022-04-04 NOTE — ED PROVIDER NOTE - ATTENDING CONTRIBUTION TO CARE
llq pain w 1 episode brbpr but then this resolved. not vomiting  mild lly / inguinal ttp  examined the  w pa.  ct / labs / refer to gi

## 2022-04-04 NOTE — ED PROVIDER NOTE - NS ED ATTENDING STATEMENT MOD
This was a shared visit with the DARSHANA. I reviewed and verified the documentation and independently performed the documented:

## 2022-04-04 NOTE — ED PROVIDER NOTE - NSFOLLOWUPINSTRUCTIONS_ED_ALL_ED_FT
Follow up with your Primary Care Physician within the next 2-3 days  Follow up with gastroenterology within 1 week  Bring a copy of your test results with you to your appointment  Continue your current medication regimen  Return to the Emergency Room if you experience new or worsening symptoms - persistent fevers, worsening abdominal pain, recurrent episodes of rectal bleeding, fainting or feeling faint, trouble breathing, inability to ear or drink

## 2022-04-04 NOTE — ED PROVIDER NOTE - RAPID ASSESSMENT
51y M with PMHx of Lyme's disease presents to the ED with 2 weeks of LLQ pain, 1 episode of bright red rectal bleeding, and 1 episode of vomiting last evening. Of note patient is currently on doxycycline prescribed by PCP for current symptoms. Last colonoscopy 10 years ago. Denies fevers, chills. Patient is conversant and in no acute distress.    Scribe Statement: Karen PEREIRA Grace, attest that this documentation has been prepared under the direction and in the presence of Cm Tate) 51y M with PMHx of Lyme's disease presents to the ED with 2 weeks of LLQ pain, 1 episode of bright red rectal bleeding, and 1 episode of vomiting last evening. Of note patient is currently on doxycycline prescribed by PCP for current symptoms. Last colonoscopy 10 years ago. Denies fevers, chills. Patient is conversant and in no acute distress.    Scribe Statement: IKaren Grace, attest that this documentation has been prepared under the direction and in the presence of Cm Tate)    I, Dr. Tate, saw patient as a rapid assessment initially via telemedicine encounter, rest of care performed by another attending, and rapid assessment documented by scribe in my presence.  Receiving team will follow up on labs, analgesia, any clinical imaging, and perform reassessment and disposition of the patient as clinically indicated.  All decisions regarding the progression of care will be made at their discretion.

## 2022-04-05 LAB
MYCOPLASMA HOMINIS CULTURE: NEGATIVE
UREAPLASMA CULTURE: NEGATIVE

## 2022-04-05 NOTE — ED POST DISCHARGE NOTE - RESULT SUMMARY
Incidental finding "Incompletely characterized posterior RIGHT lobe liver lesion" discussed with patient per progress note NOELLE Osman

## 2022-04-14 ENCOUNTER — APPOINTMENT (OUTPATIENT)
Dept: INTERNAL MEDICINE | Facility: CLINIC | Age: 52
End: 2022-04-14

## 2022-04-26 ENCOUNTER — APPOINTMENT (OUTPATIENT)
Dept: GASTROENTEROLOGY | Facility: CLINIC | Age: 52
End: 2022-04-26
Payer: MEDICAID

## 2022-04-26 ENCOUNTER — NON-APPOINTMENT (OUTPATIENT)
Age: 52
End: 2022-04-26

## 2022-04-26 VITALS
TEMPERATURE: 98.7 F | WEIGHT: 147 LBS | OXYGEN SATURATION: 98 % | HEIGHT: 69 IN | SYSTOLIC BLOOD PRESSURE: 115 MMHG | DIASTOLIC BLOOD PRESSURE: 70 MMHG | HEART RATE: 87 BPM | BODY MASS INDEX: 21.77 KG/M2

## 2022-04-26 DIAGNOSIS — K82.4 CHOLESTEROLOSIS OF GALLBLADDER: ICD-10-CM

## 2022-04-26 DIAGNOSIS — K92.1 MELENA: ICD-10-CM

## 2022-04-26 DIAGNOSIS — R10.9 UNSPECIFIED ABDOMINAL PAIN: ICD-10-CM

## 2022-04-26 PROCEDURE — 99204 OFFICE O/P NEW MOD 45 MIN: CPT

## 2022-05-02 ENCOUNTER — APPOINTMENT (OUTPATIENT)
Dept: UROLOGY | Facility: CLINIC | Age: 52
End: 2022-05-02

## 2022-05-02 ENCOUNTER — NON-APPOINTMENT (OUTPATIENT)
Age: 52
End: 2022-05-02

## 2022-05-02 LAB — SARS-COV-2 N GENE NPH QL NAA+PROBE: NOT DETECTED

## 2022-05-04 ENCOUNTER — TRANSCRIPTION ENCOUNTER (OUTPATIENT)
Age: 52
End: 2022-05-04

## 2022-05-04 ENCOUNTER — RESULT REVIEW (OUTPATIENT)
Age: 52
End: 2022-05-04

## 2022-05-04 ENCOUNTER — OUTPATIENT (OUTPATIENT)
Dept: OUTPATIENT SERVICES | Facility: HOSPITAL | Age: 52
LOS: 1 days | End: 2022-05-04
Payer: MEDICAID

## 2022-05-04 ENCOUNTER — APPOINTMENT (OUTPATIENT)
Dept: GASTROENTEROLOGY | Facility: HOSPITAL | Age: 52
End: 2022-05-04

## 2022-05-04 VITALS — HEIGHT: 69 IN | WEIGHT: 147.05 LBS | TEMPERATURE: 98 F

## 2022-05-04 VITALS
SYSTOLIC BLOOD PRESSURE: 121 MMHG | RESPIRATION RATE: 17 BRPM | OXYGEN SATURATION: 99 % | DIASTOLIC BLOOD PRESSURE: 73 MMHG | HEART RATE: 81 BPM

## 2022-05-04 DIAGNOSIS — Z98.890 OTHER SPECIFIED POSTPROCEDURAL STATES: Chronic | ICD-10-CM

## 2022-05-04 DIAGNOSIS — K92.1 MELENA: ICD-10-CM

## 2022-05-04 PROCEDURE — 88305 TISSUE EXAM BY PATHOLOGIST: CPT

## 2022-05-04 PROCEDURE — 45380 COLONOSCOPY AND BIOPSY: CPT | Mod: GC

## 2022-05-04 PROCEDURE — 45380 COLONOSCOPY AND BIOPSY: CPT

## 2022-05-04 PROCEDURE — 88305 TISSUE EXAM BY PATHOLOGIST: CPT | Mod: 26

## 2022-05-04 DEVICE — NET RETRV ROT ROTH 2.5MMX230CM: Type: IMPLANTABLE DEVICE | Status: FUNCTIONAL

## 2022-05-04 RX ORDER — SODIUM CHLORIDE 9 MG/ML
500 INJECTION INTRAMUSCULAR; INTRAVENOUS; SUBCUTANEOUS
Refills: 0 | Status: DISCONTINUED | OUTPATIENT
Start: 2022-05-04 | End: 2022-05-18

## 2022-05-04 NOTE — PRE PROCEDURE NOTE - PRE PROCEDURE EVALUATION
Attending Physician: Bismark Bailey MD    Procedure: Colonoscopy    Indication for Procedure: Hematochezia  ________________________________________________________  PAST MEDICAL & SURGICAL HISTORY:  Hypotestosteronism    DAVF (dural arteriovenous fistula)  dx: &#x27; 2017    Lyme disease  suspected. Treated 5/2018 to 8/2018 with I.V. Antibiotic via PIC Line    Status post colonoscopy  &#x27; 2013   Negative      ALLERGIES:  Avelox (Other)  Cipro (Other)  Levaquin (Other)    HOME MEDICATIONS:  acetaminophen 325 mg oral tablet: 2 tab(s) orally every 6 hours, As needed, For Temp greater than 38 C (100.4 F)  acetaminophen 325 mg oral tablet: 2 tab(s) orally every 6 hours, As needed, Mild Pain (1 - 3)  docusate sodium 100 mg oral capsule: 1 cap(s) orally 2 times a day  Multiple Vitamins oral tablet: 1 tab(s) orally once a day  senna oral tablet: 2 tab(s) orally once a day (at bedtime)    AICD/PPM: [ ] yes   [X ] no    PERTINENT LAB DATA:                      PHYSICAL EXAMINATION:    T(C): --  HR: --  BP: --  RR: --  SpO2: --    Constitutional: NAD  HEENT: PERRLA, EOMI,    Neck:  No JVD  Respiratory: CTAB/L  Cardiovascular: S1 and S2  Gastrointestinal: BS+, soft, NT/ND  Extremities: No peripheral edema  Neurological: A/O x 3, no focal deficits  Psychiatric: Normal mood, normal affect  Skin: No rashes    ASA Class: I [ ]  II [X]  III [ ]  IV [ ]    COMMENTS:    The patient is a suitable candidate for the planned procedure unless box checked [ ]  No, explain:

## 2022-05-04 NOTE — CONSULT LETTER
[Dear  ___] : Dear  [unfilled], [Consult Letter:] : I had the pleasure of evaluating your patient, [unfilled]. [Please see my note below.] : Please see my note below. [Consult Closing:] : Thank you very much for allowing me to participate in the care of this patient.  If you have any questions, please do not hesitate to contact me. [Sincerely,] : Sincerely, [FreeTextEntry2] : Dr. Saud Clemons [FreeTextEntry3] : Godfrey Bailey MD\par [facility]\par  of Medicine, [facility] [employer] at \[facility]\""/[facility]\par Tel: [phone]\par Fax: [phone]\par

## 2022-05-04 NOTE — ASSESSMENT
[FreeTextEntry1] : Impression:\par 1. Lower abdominal pain with hematochezia  - differential includes inflammatory bowel disease/colitis, colonic neoplasms, rectal internal hemorrhoids not seen on exam\par 2. Liver 1.3cm hypodense lesion, likely hemangioma, less likely primary or metastatic neoplasms\par \par Plan:\par -Given hematochezia which is an alarm symptom and that patient has not had recent endoscopic evaluation, will plan for colonoscopy\par -Risks and benefits of colonoscopy including but not limited to bleeding, infection, missed lesions, perforation, anesthesia side effects were discussed with patient. All questions answered. Patient is agreeable to the procedure.\par -Diet and bowel prep instructions explained to patient\par -Will obtain routine MRI to evaluate the small hepatic liver seen on CT, likely hemangioma

## 2022-05-04 NOTE — HISTORY OF PRESENT ILLNESS
[Heartburn] : denies heartburn [Nausea] : denies nausea [Vomiting] : denies vomiting [Diarrhea] : denies diarrhea [Constipation] : denies constipation [Yellow Skin Or Eyes (Jaundice)] : denies jaundice [Abdominal Swelling] : denies abdominal swelling [Abdominal Pain] : abdominal pain [_________] : Performed [unfilled] [Wt Loss ___ Lbs] : no recent weight loss [de-identified] : This is a 51 year old male with no significant medical problems, who presents for evaluation of lower abdominal pain/rectal discomfort.\par \par Patient started to have discomfort in his lower abdomen/rectal since February/March. It's worse after he's been sitting for long periods of time, and better when he wakes up after a night of sleep. He reports his stools are still formed usually. He saw his urologist and was given Doxycycline which he took for a week for prostatitis, and developed bloody diarrhea and thus went to the ER on 4/4/22. He had just one episode of the bleeding (he showed me images of it on his cellphone, it's grossly red blood with some loose red stool, no brown or melena). He had a CT, and was sent home that day. He reports stools are now non-bloody. He denies nausea/vomiting/trouble eating. He has not had fevers/chills/weight loss. There is no colon cancer in his family. He had a colonoscopy when he was 40 years old for unclear reasons, and reports it was unremarkable.\par \par 4/4/22: WBC 8.2, Hgb 15.4, Hct 45, \par Na 140, k 3.9, Cl 100, bicarb 26, BUN 10, Cr 1.06, glucose 97, Ca 10.1, T protein 7.8, albumin 5.3, T bili 1., ALP 50, AST 22, ALT 11 [de-identified] : FINDINGS:\par LOWER CHEST: Within normal limits.\par \par LIVER: 1.3 x 1.2 cm circular nonenhancing hypodensity in the right \par hepatic lobe (series 2 image 33), which is incompletely characterized..\par BILE DUCTS: Normal caliber.\par GALLBLADDER: Within normal limits.\par SPLEEN: Within normal limits.\par PANCREAS: Within normal limits.\par ADRENALS: Within normal limits.\par KIDNEYS/URETERS: Within normal limits.\par \par BLADDER: Mildly distended.\par REPRODUCTIVE ORGANS: Prostate is enlarged.\par \par BOWEL: No bowel obstruction. Appendix is normal. Sigmoid diverticulosis \par without acute diverticulitis.\par PERITONEUM: No ascites.\par VESSELS: Within normal limits.\par RETROPERITONEUM/LYMPH NODES: No lymphadenopathy.\par ABDOMINAL WALL: Within normal limits.\par BONES: Within normal limits.\par \par IMPRESSION:\par Incompletely characterized posterior RIGHT lobe liver lesion, may \par represent hemangioma. Nonemergent hepatic MRI is recommended for further \par evaluation.

## 2022-05-06 LAB — SURGICAL PATHOLOGY STUDY: SIGNIFICANT CHANGE UP

## 2022-08-24 ENCOUNTER — APPOINTMENT (OUTPATIENT)
Dept: INTERNAL MEDICINE | Facility: CLINIC | Age: 52
End: 2022-08-24

## 2022-08-24 VITALS
HEIGHT: 69 IN | OXYGEN SATURATION: 97 % | TEMPERATURE: 98.5 F | BODY MASS INDEX: 22.81 KG/M2 | WEIGHT: 154 LBS | RESPIRATION RATE: 16 BRPM | HEART RATE: 62 BPM | DIASTOLIC BLOOD PRESSURE: 87 MMHG | SYSTOLIC BLOOD PRESSURE: 133 MMHG

## 2022-08-24 DIAGNOSIS — R60.0 LOCALIZED EDEMA: ICD-10-CM

## 2022-08-24 DIAGNOSIS — Z91.81 HISTORY OF FALLING: ICD-10-CM

## 2022-08-24 PROCEDURE — 99214 OFFICE O/P EST MOD 30 MIN: CPT

## 2022-08-24 RX ORDER — DOXYCYCLINE 100 MG/1
100 CAPSULE ORAL TWICE DAILY
Qty: 60 | Refills: 0 | Status: DISCONTINUED | COMMUNITY
Start: 2022-03-28 | End: 2022-08-24

## 2022-08-24 RX ORDER — AMOXICILLIN AND CLAVULANATE POTASSIUM 875; 125 MG/1; MG/1
875-125 TABLET, COATED ORAL
Qty: 20 | Refills: 0 | Status: DISCONTINUED | COMMUNITY
Start: 2022-03-12 | End: 2022-08-24

## 2022-08-24 RX ORDER — SODIUM SULFATE, POTASSIUM SULFATE, MAGNESIUM SULFATE 17.5; 3.13; 1.6 G/ML; G/ML; G/ML
17.5-3.13-1.6 SOLUTION, CONCENTRATE ORAL
Qty: 1 | Refills: 0 | Status: DISCONTINUED | COMMUNITY
Start: 2022-04-26 | End: 2022-08-24

## 2022-08-24 NOTE — PHYSICAL EXAM
[No JVD] : no jugular venous distention [No Respiratory Distress] : no respiratory distress  [Normal Rate] : normal rate  [Normal] : affect was normal and insight and judgment were intact [de-identified] : TRACE EDEMA RLE ;NL CALVES

## 2022-08-24 NOTE — HISTORY OF PRESENT ILLNESS
[de-identified] : PT COMES FOR F/U RLE TRAUMA 10/21,WAS RX XR R TIBIA AND US DOPPLER BUT NEVER DID \par CONCERN ABOUT RLE EDEMA MOR FER LEFT TOWARDS END OF THE DAY \par NO SX EXCEPT FROM SMALL TENDERNESS ANTERIOR TIBIAL BONE ON /OFF

## 2022-08-24 NOTE — ASSESSMENT
[FreeTextEntry1] : 52 Y OLD MALE WS/P FALL OVER 10 M AGO WITH PERSISTENT BUT DECREASING RLE EDEMA= US DOPPLER ;LEG ELEVATION ,INCREASE WALKING

## 2022-09-23 NOTE — ED ADULT NURSE NOTE - EENT WDL
Eyes with no visual disturbances.  Ears clean and dry and no hearing difficulties. Nose with pink mucosa and no drainage.  Mouth mucous membranes moist and pink.  No tenderness or swelling to throat or neck. Show Text Field For Brand Names Of Contraception?: Yes

## 2022-10-24 ENCOUNTER — RX RENEWAL (OUTPATIENT)
Age: 52
End: 2022-10-24

## 2023-06-12 ENCOUNTER — RX RENEWAL (OUTPATIENT)
Age: 53
End: 2023-06-12

## 2023-07-27 ENCOUNTER — APPOINTMENT (OUTPATIENT)
Dept: INTERNAL MEDICINE | Facility: CLINIC | Age: 53
End: 2023-07-27
Payer: MEDICAID

## 2023-07-27 VITALS
WEIGHT: 162 LBS | TEMPERATURE: 97.6 F | OXYGEN SATURATION: 98 % | DIASTOLIC BLOOD PRESSURE: 83 MMHG | RESPIRATION RATE: 14 BRPM | HEIGHT: 69 IN | SYSTOLIC BLOOD PRESSURE: 120 MMHG | HEART RATE: 70 BPM | BODY MASS INDEX: 23.99 KG/M2

## 2023-07-27 DIAGNOSIS — I67.1 CEREBRAL ANEURYSM, NONRUPTURED: ICD-10-CM

## 2023-07-27 DIAGNOSIS — F51.04 PSYCHOPHYSIOLOGIC INSOMNIA: ICD-10-CM

## 2023-07-27 PROCEDURE — 99214 OFFICE O/P EST MOD 30 MIN: CPT

## 2023-07-27 RX ORDER — MIRTAZAPINE 15 MG/1
15 TABLET, FILM COATED ORAL
Qty: 30 | Refills: 3 | Status: ACTIVE | COMMUNITY
Start: 2019-07-24 | End: 1900-01-01

## 2023-07-27 NOTE — ASSESSMENT
[FreeTextEntry1] : 53 Y OLD MALE WITH CHRONIC INSOMNIA AFTER CEREBROVASCULAR AV  FISTULA RX = MIRTAZAPINE RX SENT \par RTO FOR CPE ;DECLINES LABS TODAY

## 2023-07-27 NOTE — HISTORY OF PRESENT ILLNESS
[de-identified] : COMES FOR F/U INSOMNIA ;TAKES MIRTAZAPINE AND OTC MELATONIN MOST NIGHTS FOR LAST 10 Y

## 2023-07-27 NOTE — PHYSICAL EXAM
[No Acute Distress] : no acute distress [Normal Sclera/Conjunctiva] : normal sclera/conjunctiva [Normal Outer Ear/Nose] : the outer ears and nose were normal in appearance [Normal] : soft, non-tender, non-distended, no masses palpated, no HSM and normal bowel sounds [No CVA Tenderness] : no CVA  tenderness [No Joint Swelling] : no joint swelling [No Rash] : no rash [Coordination Grossly Intact] : coordination grossly intact [Alert and Oriented x3] : oriented to person, place, and time

## 2024-03-25 NOTE — ED PROVIDER NOTE - CONSTITUTIONAL, MLM
3/25/24 Left Message on Machine to Call Back    normal... Well appearing, well nourished, awake, alert, oriented to person, place, time/situation and in no apparent distress, mildly anxious

## 2024-06-14 NOTE — ED ADULT NURSE NOTE - BREATH SOUNDS, MLM
Continue to focus on a healthy diet and exercise.  Obtain fasting labs as ordered today.  Follow up in 1 year.   Clear

## 2024-07-12 ENCOUNTER — RX RENEWAL (OUTPATIENT)
Age: 54
End: 2024-07-12

## 2025-02-18 ENCOUNTER — RX RENEWAL (OUTPATIENT)
Age: 55
End: 2025-02-18

## 2025-04-10 ENCOUNTER — RX RENEWAL (OUTPATIENT)
Age: 55
End: 2025-04-10

## 2025-05-15 ENCOUNTER — RX RENEWAL (OUTPATIENT)
Age: 55
End: 2025-05-15

## 2025-07-22 ENCOUNTER — NON-APPOINTMENT (OUTPATIENT)
Age: 55
End: 2025-07-22

## 2025-07-23 ENCOUNTER — APPOINTMENT (OUTPATIENT)
Dept: PULMONOLOGY | Facility: CLINIC | Age: 55
End: 2025-07-23
Payer: COMMERCIAL

## 2025-07-23 VITALS
BODY MASS INDEX: 22.51 KG/M2 | DIASTOLIC BLOOD PRESSURE: 76 MMHG | SYSTOLIC BLOOD PRESSURE: 118 MMHG | HEIGHT: 69 IN | OXYGEN SATURATION: 97 % | WEIGHT: 152 LBS | RESPIRATION RATE: 17 BRPM | HEART RATE: 58 BPM

## 2025-07-23 DIAGNOSIS — F51.04 PSYCHOPHYSIOLOGIC INSOMNIA: ICD-10-CM

## 2025-07-23 PROCEDURE — 99204 OFFICE O/P NEW MOD 45 MIN: CPT | Mod: GC

## 2025-07-23 PROCEDURE — G2211 COMPLEX E/M VISIT ADD ON: CPT | Mod: NC

## 2025-07-24 ENCOUNTER — NON-APPOINTMENT (OUTPATIENT)
Age: 55
End: 2025-07-24

## 2025-07-24 ENCOUNTER — APPOINTMENT (OUTPATIENT)
Dept: INTERNAL MEDICINE | Facility: CLINIC | Age: 55
End: 2025-07-24
Payer: COMMERCIAL

## 2025-07-24 VITALS
HEART RATE: 83 BPM | RESPIRATION RATE: 17 BRPM | TEMPERATURE: 97.5 F | BODY MASS INDEX: 22.96 KG/M2 | WEIGHT: 155 LBS | OXYGEN SATURATION: 99 % | DIASTOLIC BLOOD PRESSURE: 77 MMHG | SYSTOLIC BLOOD PRESSURE: 116 MMHG | HEIGHT: 69 IN

## 2025-07-24 DIAGNOSIS — R94.31 ABNORMAL ELECTROCARDIOGRAM [ECG] [EKG]: ICD-10-CM

## 2025-07-24 DIAGNOSIS — Z00.00 ENCOUNTER FOR GENERAL ADULT MEDICAL EXAMINATION W/OUT ABNORMAL FINDINGS: ICD-10-CM

## 2025-07-24 DIAGNOSIS — I48.0 PAROXYSMAL ATRIAL FIBRILLATION: ICD-10-CM

## 2025-07-24 PROCEDURE — 93000 ELECTROCARDIOGRAM COMPLETE: CPT

## 2025-07-24 PROCEDURE — 99215 OFFICE O/P EST HI 40 MIN: CPT

## 2025-07-24 RX ORDER — APIXABAN 5 MG/1
5 TABLET, FILM COATED ORAL
Qty: 60 | Refills: 0 | Status: ACTIVE | COMMUNITY
Start: 2025-07-24 | End: 1900-01-01

## 2025-07-25 LAB
25(OH)D3 SERPL-MCNC: 33.1 NG/ML
ALBUMIN SERPL ELPH-MCNC: 5.2 G/DL
ALP BLD-CCNC: 59 U/L
ALT SERPL-CCNC: 16 U/L
ANION GAP SERPL CALC-SCNC: 18 MMOL/L
APPEARANCE: CLEAR
AST SERPL-CCNC: 37 U/L
BILIRUB SERPL-MCNC: 0.7 MG/DL
BILIRUBIN URINE: NEGATIVE
BLOOD URINE: NEGATIVE
BUN SERPL-MCNC: 15 MG/DL
CALCIUM SERPL-MCNC: 10.1 MG/DL
CHLORIDE SERPL-SCNC: 103 MMOL/L
CHOLEST SERPL-MCNC: 222 MG/DL
CO2 SERPL-SCNC: 20 MMOL/L
COLOR: YELLOW
CREAT SERPL-MCNC: 1.01 MG/DL
EGFRCR SERPLBLD CKD-EPI 2021: 88 ML/MIN/1.73M2
GLUCOSE QUALITATIVE U: NEGATIVE MG/DL
GLUCOSE SERPL-MCNC: 94 MG/DL
HCT VFR BLD CALC: 54.1 %
HDLC SERPL-MCNC: 63 MG/DL
HGB BLD-MCNC: 17.5 G/DL
KETONES URINE: NEGATIVE MG/DL
LDLC SERPL-MCNC: 140 MG/DL
LEUKOCYTE ESTERASE URINE: NEGATIVE
MCHC RBC-ENTMCNC: 29.7 PG
MCHC RBC-ENTMCNC: 32.3 G/DL
MCV RBC AUTO: 91.9 FL
NITRITE URINE: NEGATIVE
NONHDLC SERPL-MCNC: 159 MG/DL
PH URINE: 6
PLATELET # BLD AUTO: 284 K/UL
POTASSIUM SERPL-SCNC: 5.2 MMOL/L
PROT SERPL-MCNC: 7.8 G/DL
PROTEIN URINE: NEGATIVE MG/DL
PSA FREE FLD-MCNC: 14 %
PSA FREE SERPL-MCNC: 0.41 NG/ML
PSA SERPL-MCNC: 2.88 NG/ML
RBC # BLD: 5.89 M/UL
RBC # FLD: 13.2 %
SODIUM SERPL-SCNC: 142 MMOL/L
SPECIFIC GRAVITY URINE: 1.01
TRIGL SERPL-MCNC: 108 MG/DL
TSH SERPL-ACNC: 1.29 UIU/ML
UROBILINOGEN URINE: 0.2 MG/DL
WBC # FLD AUTO: 7.68 K/UL

## 2025-08-01 ENCOUNTER — APPOINTMENT (OUTPATIENT)
Dept: CARDIOLOGY | Facility: CLINIC | Age: 55
End: 2025-08-01

## (undated) DEVICE — SYR LUER LOK 50CC

## (undated) DEVICE — POLY TRAP ETRAP

## (undated) DEVICE — CLAMP BX HOT RAD JAW 3

## (undated) DEVICE — TUBING IV SET GRAVITY 3Y 100" MACRO

## (undated) DEVICE — IRRIGATOR BIO SHIELD

## (undated) DEVICE — BRUSH COLONOSCOPY CYTOLOGY

## (undated) DEVICE — CATH IV SAFE BC 22G X 1" (BLUE)

## (undated) DEVICE — SENSOR O2 FINGER ADULT

## (undated) DEVICE — TUBING SUCTION 20FT

## (undated) DEVICE — TUBING SUCTION CONN 6FT STERILE

## (undated) DEVICE — ELCTR GROUNDING PAD ADULT COVIDIEN

## (undated) DEVICE — FORCEP RADIAL JAW 4 JUMBO 2.8MM 3.2MM 240CM ORANGE DISP

## (undated) DEVICE — CATH IV SAFE BC 20G X 1.16" (PINK)

## (undated) DEVICE — SOL INJ NS 0.9% 500ML 2 PORT

## (undated) DEVICE — BIOPSY FORCEP RADIAL JAW 4 STANDARD WITH NEEDLE

## (undated) DEVICE — FOLEY HOLDER STATLOCK 2 WAY ADULT

## (undated) DEVICE — SUCTION YANKAUER NO CONTROL VENT

## (undated) DEVICE — PACK IV START WITH CHG